# Patient Record
Sex: FEMALE | Race: WHITE | NOT HISPANIC OR LATINO | URBAN - METROPOLITAN AREA
[De-identification: names, ages, dates, MRNs, and addresses within clinical notes are randomized per-mention and may not be internally consistent; named-entity substitution may affect disease eponyms.]

---

## 2018-07-05 ENCOUNTER — EMERGENCY (EMERGENCY)
Facility: HOSPITAL | Age: 68
LOS: 1 days | Discharge: HOME | End: 2018-07-05
Admitting: EMERGENCY MEDICINE
Payer: MEDICARE

## 2018-07-05 VITALS
OXYGEN SATURATION: 100 % | TEMPERATURE: 98 F | WEIGHT: 119.93 LBS | HEART RATE: 64 BPM | DIASTOLIC BLOOD PRESSURE: 88 MMHG | RESPIRATION RATE: 17 BRPM | SYSTOLIC BLOOD PRESSURE: 144 MMHG
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
## 2018-07-05 DIAGNOSIS — S61.210A LACERATION WITHOUT FOREIGN BODY OF RIGHT INDEX FINGER WITHOUT DAMAGE TO NAIL, INITIAL ENCOUNTER: ICD-10-CM
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
## 2018-07-05 DIAGNOSIS — Z23 ENCOUNTER FOR IMMUNIZATION: ICD-10-CM

## 2018-07-05 DIAGNOSIS — S61.201A UNSPECIFIED OPEN WOUND OF LEFT INDEX FINGER WITHOUT DAMAGE TO NAIL, INITIAL ENCOUNTER: ICD-10-CM

## 2018-07-05 PROCEDURE — 99283 EMERGENCY DEPT VISIT LOW MDM: CPT

## 2018-07-05 RX ORDER — TETANUS TOXOID, REDUCED DIPHTHERIA TOXOID AND ACELLULAR PERTUSSIS VACCINE, ADSORBED 5; 2.5; 8; 8; 2.5 [IU]/.5ML; [IU]/.5ML; UG/.5ML; UG/.5ML; UG/.5ML
0.5 SUSPENSION INTRAMUSCULAR ONCE
Qty: 0 | Refills: 0 | Status: COMPLETED | OUTPATIENT
Start: 2018-07-05 | End: 2018-07-05

## 2018-07-05 RX ADMIN — TETANUS TOXOID, REDUCED DIPHTHERIA TOXOID AND ACELLULAR PERTUSSIS VACCINE, ADSORBED 0.5 MILLILITER(S): 5; 2.5; 8; 8; 2.5 SUSPENSION INTRAMUSCULAR at 10:24

## 2018-07-05 NOTE — ED PROVIDER NOTE - SKIN, MLM
2cm superficial laceration to the ulnar side of 2nd digit near pip joint, nerve vascular intact, cap refill <2 sec, no active bleeding.

## 2018-07-05 NOTE — ED ADULT TRIAGE NOTE - CHIEF COMPLAINT QUOTE
pt. aaox3 c/o laceration to pointer finger with glass. pt. has controlled bleeding with band aid. pt. unknown last tetanus.

## 2018-07-05 NOTE — ED PROVIDER NOTE - OBJECTIVE STATEMENT
69 y/o F w/ no significant pmhx presents to ED w/ c/o laceration to R index finger that occurred last night while cleaning a glass. Pt is not sure when last tetanus. Reports that the glass she was cleaning cut in one large piece, no concern for small shards in wound. No numbness, no weakness, no tingling. Pt has full ROM, no active bleeding. Pt noticed blood in bandage this morning and wanted it checked out.

## 2021-04-25 ENCOUNTER — INPATIENT (INPATIENT)
Facility: HOSPITAL | Age: 71
LOS: 3 days | Discharge: HOME HEALTH | DRG: 481 | End: 2021-04-29
Attending: STUDENT IN AN ORGANIZED HEALTH CARE EDUCATION/TRAINING PROGRAM | Admitting: STUDENT IN AN ORGANIZED HEALTH CARE EDUCATION/TRAINING PROGRAM
Payer: MEDICARE

## 2021-04-25 ENCOUNTER — TRANSCRIPTION ENCOUNTER (OUTPATIENT)
Age: 71
End: 2021-04-25

## 2021-04-25 VITALS
SYSTOLIC BLOOD PRESSURE: 151 MMHG | RESPIRATION RATE: 16 BRPM | DIASTOLIC BLOOD PRESSURE: 86 MMHG | TEMPERATURE: 98 F | WEIGHT: 115.08 LBS | HEIGHT: 66 IN | HEART RATE: 85 BPM | OXYGEN SATURATION: 99 %

## 2021-04-25 DIAGNOSIS — Z93.2 ILEOSTOMY STATUS: Chronic | ICD-10-CM

## 2021-04-25 LAB
ALBUMIN SERPL ELPH-MCNC: 3.3 G/DL — LOW (ref 3.4–5)
ALP SERPL-CCNC: 68 U/L — SIGNIFICANT CHANGE UP (ref 40–120)
ALT FLD-CCNC: 22 U/L — SIGNIFICANT CHANGE UP (ref 12–42)
ANION GAP SERPL CALC-SCNC: 8 MMOL/L — LOW (ref 9–16)
APPEARANCE UR: CLEAR — SIGNIFICANT CHANGE UP
APTT BLD: 28.1 SEC — SIGNIFICANT CHANGE UP (ref 27.5–35.5)
AST SERPL-CCNC: 17 U/L — SIGNIFICANT CHANGE UP (ref 15–37)
BACTERIA # UR AUTO: PRESENT /HPF
BILIRUB SERPL-MCNC: 0.2 MG/DL — SIGNIFICANT CHANGE UP (ref 0.2–1.2)
BILIRUB UR-MCNC: NEGATIVE — SIGNIFICANT CHANGE UP
BUN SERPL-MCNC: 21 MG/DL — SIGNIFICANT CHANGE UP (ref 7–23)
CALCIUM SERPL-MCNC: 9.1 MG/DL — SIGNIFICANT CHANGE UP (ref 8.5–10.5)
CHLORIDE SERPL-SCNC: 106 MMOL/L — SIGNIFICANT CHANGE UP (ref 96–108)
CK SERPL-CCNC: 85 U/L — SIGNIFICANT CHANGE UP (ref 26–192)
CO2 SERPL-SCNC: 25 MMOL/L — SIGNIFICANT CHANGE UP (ref 22–31)
COLOR SPEC: YELLOW — SIGNIFICANT CHANGE UP
CREAT SERPL-MCNC: 0.91 MG/DL — SIGNIFICANT CHANGE UP (ref 0.5–1.3)
DIFF PNL FLD: NEGATIVE — SIGNIFICANT CHANGE UP
GLUCOSE SERPL-MCNC: 117 MG/DL — HIGH (ref 70–99)
GLUCOSE UR QL: NEGATIVE — SIGNIFICANT CHANGE UP
HCT VFR BLD CALC: 39.9 % — SIGNIFICANT CHANGE UP (ref 34.5–45)
HGB BLD-MCNC: 13.4 G/DL — SIGNIFICANT CHANGE UP (ref 11.5–15.5)
INR BLD: 1 — SIGNIFICANT CHANGE UP (ref 0.88–1.16)
KETONES UR-MCNC: NEGATIVE — SIGNIFICANT CHANGE UP
LEUKOCYTE ESTERASE UR-ACNC: ABNORMAL
MCHC RBC-ENTMCNC: 30.5 PG — SIGNIFICANT CHANGE UP (ref 27–34)
MCHC RBC-ENTMCNC: 33.6 GM/DL — SIGNIFICANT CHANGE UP (ref 32–36)
MCV RBC AUTO: 90.9 FL — SIGNIFICANT CHANGE UP (ref 80–100)
NITRITE UR-MCNC: POSITIVE
NRBC # BLD: 0 /100 WBCS — SIGNIFICANT CHANGE UP (ref 0–0)
PH UR: 5.5 — SIGNIFICANT CHANGE UP (ref 5–8)
PLATELET # BLD AUTO: 295 K/UL — SIGNIFICANT CHANGE UP (ref 150–400)
POTASSIUM SERPL-MCNC: 3.1 MMOL/L — LOW (ref 3.5–5.3)
POTASSIUM SERPL-SCNC: 3.1 MMOL/L — LOW (ref 3.5–5.3)
PROT SERPL-MCNC: 7 G/DL — SIGNIFICANT CHANGE UP (ref 6.4–8.2)
PROT UR-MCNC: NEGATIVE MG/DL — SIGNIFICANT CHANGE UP
PROTHROM AB SERPL-ACNC: 11.8 SEC — SIGNIFICANT CHANGE UP (ref 10.6–13.6)
RBC # BLD: 4.39 M/UL — SIGNIFICANT CHANGE UP (ref 3.8–5.2)
RBC # FLD: 14.1 % — SIGNIFICANT CHANGE UP (ref 10.3–14.5)
RBC CASTS # UR COMP ASSIST: < 5 /HPF — SIGNIFICANT CHANGE UP
SARS-COV-2 RNA SPEC QL NAA+PROBE: SIGNIFICANT CHANGE UP
SODIUM SERPL-SCNC: 139 MMOL/L — SIGNIFICANT CHANGE UP (ref 132–145)
SP GR SPEC: 1.02 — SIGNIFICANT CHANGE UP (ref 1–1.03)
TROPONIN I SERPL-MCNC: <0.017 NG/ML — LOW (ref 0.02–0.06)
TROPONIN T SERPL-MCNC: 0.01 NG/ML — SIGNIFICANT CHANGE UP (ref 0–0.01)
UROBILINOGEN FLD QL: 0.2 E.U./DL — SIGNIFICANT CHANGE UP
WBC # BLD: 7.9 K/UL — SIGNIFICANT CHANGE UP (ref 3.8–10.5)
WBC # FLD AUTO: 7.9 K/UL — SIGNIFICANT CHANGE UP (ref 3.8–10.5)
WBC UR QL: < 5 /HPF — SIGNIFICANT CHANGE UP

## 2021-04-25 PROCEDURE — 71045 X-RAY EXAM CHEST 1 VIEW: CPT | Mod: 26

## 2021-04-25 PROCEDURE — 99221 1ST HOSP IP/OBS SF/LOW 40: CPT

## 2021-04-25 PROCEDURE — 73700 CT LOWER EXTREMITY W/O DYE: CPT | Mod: 26,RT,MH

## 2021-04-25 PROCEDURE — 99285 EMERGENCY DEPT VISIT HI MDM: CPT | Mod: CS

## 2021-04-25 PROCEDURE — 73501 X-RAY EXAM HIP UNI 1 VIEW: CPT | Mod: 26,RT

## 2021-04-25 PROCEDURE — 73562 X-RAY EXAM OF KNEE 3: CPT | Mod: 26,RT

## 2021-04-25 RX ORDER — SODIUM CHLORIDE 9 MG/ML
1000 INJECTION, SOLUTION INTRAVENOUS
Refills: 0 | Status: DISCONTINUED | OUTPATIENT
Start: 2021-04-25 | End: 2021-04-26

## 2021-04-25 RX ORDER — ACETAMINOPHEN 500 MG
975 TABLET ORAL EVERY 8 HOURS
Refills: 0 | Status: DISCONTINUED | OUTPATIENT
Start: 2021-04-25 | End: 2021-04-26

## 2021-04-25 RX ORDER — MORPHINE SULFATE 50 MG/1
2 CAPSULE, EXTENDED RELEASE ORAL ONCE
Refills: 0 | Status: DISCONTINUED | OUTPATIENT
Start: 2021-04-25 | End: 2021-04-25

## 2021-04-25 RX ORDER — OXYCODONE AND ACETAMINOPHEN 5; 325 MG/1; MG/1
1 TABLET ORAL EVERY 4 HOURS
Refills: 0 | Status: DISCONTINUED | OUTPATIENT
Start: 2021-04-25 | End: 2021-04-25

## 2021-04-25 RX ORDER — CHLORHEXIDINE GLUCONATE 213 G/1000ML
1 SOLUTION TOPICAL EVERY 12 HOURS
Refills: 0 | Status: COMPLETED | OUTPATIENT
Start: 2021-04-25 | End: 2021-04-26

## 2021-04-25 RX ORDER — POTASSIUM CHLORIDE 20 MEQ
10 PACKET (EA) ORAL ONCE
Refills: 0 | Status: COMPLETED | OUTPATIENT
Start: 2021-04-25 | End: 2021-04-25

## 2021-04-25 RX ORDER — SENNA PLUS 8.6 MG/1
2 TABLET ORAL AT BEDTIME
Refills: 0 | Status: DISCONTINUED | OUTPATIENT
Start: 2021-04-25 | End: 2021-04-26

## 2021-04-25 RX ORDER — OXYCODONE HYDROCHLORIDE 5 MG/1
5 TABLET ORAL EVERY 4 HOURS
Refills: 0 | Status: DISCONTINUED | OUTPATIENT
Start: 2021-04-25 | End: 2021-04-26

## 2021-04-25 RX ORDER — MORPHINE SULFATE 50 MG/1
4 CAPSULE, EXTENDED RELEASE ORAL ONCE
Refills: 0 | Status: DISCONTINUED | OUTPATIENT
Start: 2021-04-25 | End: 2021-04-25

## 2021-04-25 RX ORDER — CYCLOBENZAPRINE HYDROCHLORIDE 10 MG/1
5 TABLET, FILM COATED ORAL THREE TIMES A DAY
Refills: 0 | Status: DISCONTINUED | OUTPATIENT
Start: 2021-04-25 | End: 2021-04-29

## 2021-04-25 RX ORDER — SODIUM CHLORIDE 9 MG/ML
1000 INJECTION INTRAMUSCULAR; INTRAVENOUS; SUBCUTANEOUS ONCE
Refills: 0 | Status: COMPLETED | OUTPATIENT
Start: 2021-04-25 | End: 2021-04-25

## 2021-04-25 RX ORDER — POLYETHYLENE GLYCOL 3350 17 G/17G
17 POWDER, FOR SOLUTION ORAL DAILY
Refills: 0 | Status: DISCONTINUED | OUTPATIENT
Start: 2021-04-25 | End: 2021-04-26

## 2021-04-25 RX ORDER — HYDROMORPHONE HYDROCHLORIDE 2 MG/ML
0.5 INJECTION INTRAMUSCULAR; INTRAVENOUS; SUBCUTANEOUS ONCE
Refills: 0 | Status: DISCONTINUED | OUTPATIENT
Start: 2021-04-25 | End: 2021-04-25

## 2021-04-25 RX ORDER — POVIDONE-IODINE 5 %
1 AEROSOL (ML) TOPICAL ONCE
Refills: 0 | Status: COMPLETED | OUTPATIENT
Start: 2021-04-25 | End: 2021-04-26

## 2021-04-25 RX ORDER — OXYCODONE HYDROCHLORIDE 5 MG/1
10 TABLET ORAL EVERY 4 HOURS
Refills: 0 | Status: DISCONTINUED | OUTPATIENT
Start: 2021-04-25 | End: 2021-04-26

## 2021-04-25 RX ORDER — ACETAMINOPHEN 500 MG
650 TABLET ORAL ONCE
Refills: 0 | Status: COMPLETED | OUTPATIENT
Start: 2021-04-25 | End: 2021-04-25

## 2021-04-25 RX ORDER — HYDROMORPHONE HYDROCHLORIDE 2 MG/ML
0.5 INJECTION INTRAMUSCULAR; INTRAVENOUS; SUBCUTANEOUS EVERY 4 HOURS
Refills: 0 | Status: DISCONTINUED | OUTPATIENT
Start: 2021-04-25 | End: 2021-04-26

## 2021-04-25 RX ADMIN — MORPHINE SULFATE 2 MILLIGRAM(S): 50 CAPSULE, EXTENDED RELEASE ORAL at 19:42

## 2021-04-25 RX ADMIN — OXYCODONE HYDROCHLORIDE 10 MILLIGRAM(S): 5 TABLET ORAL at 23:46

## 2021-04-25 RX ADMIN — MORPHINE SULFATE 2 MILLIGRAM(S): 50 CAPSULE, EXTENDED RELEASE ORAL at 20:25

## 2021-04-25 RX ADMIN — MORPHINE SULFATE 4 MILLIGRAM(S): 50 CAPSULE, EXTENDED RELEASE ORAL at 17:14

## 2021-04-25 RX ADMIN — SODIUM CHLORIDE 100 MILLILITER(S): 9 INJECTION, SOLUTION INTRAVENOUS at 23:40

## 2021-04-25 RX ADMIN — HYDROMORPHONE HYDROCHLORIDE 0.5 MILLIGRAM(S): 2 INJECTION INTRAMUSCULAR; INTRAVENOUS; SUBCUTANEOUS at 21:07

## 2021-04-25 RX ADMIN — Medication 100 MILLIEQUIVALENT(S): at 18:44

## 2021-04-25 RX ADMIN — SODIUM CHLORIDE 250 MILLILITER(S): 9 INJECTION INTRAMUSCULAR; INTRAVENOUS; SUBCUTANEOUS at 18:45

## 2021-04-25 RX ADMIN — Medication 650 MILLIGRAM(S): at 15:57

## 2021-04-25 NOTE — ED PROVIDER NOTE - OBJECTIVE STATEMENT
70y Female presents to the ED complaining of fall. Patient was walking and got his foot stuck in a divot. Patient reports tenderness in right knee and minor abrasions to the bilateral knees.

## 2021-04-25 NOTE — H&P ADULT - HISTORY OF PRESENT ILLNESS
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 States she was crossing the street when she tripped over a pothole and fell forward onto her hands and knees, with R knee taking most of weight of fall. Denies head trauma or LOC, pain or injury elsewhere.  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 States she was crossing the street when she tripped over a pothole and fell forward onto her hands and knees, with R knee taking most of weight of fall. Denies head trauma or LOC, pain or injury elsewhere. Denies numbness or parasthesias in the right leg. First time injury to the knee.

## 2021-04-25 NOTE — H&P ADULT - NSICDXPASTMEDICALHX_GEN_ALL_CORE_FT
PAST MEDICAL HISTORY:  History of ulcerative colitis s/p Cordova pouch procedure    Substance abuse now in recovery

## 2021-04-25 NOTE — ED ADULT TRIAGE NOTE - CHIEF COMPLAINT QUOTE
BIBA s/p mechanical fall c/o right knee with bilateral knee abrasions. no head injury. denies ACs use. +tetanus UTD

## 2021-04-25 NOTE — ED PROVIDER NOTE - CARE PLAN
Principal Discharge DX:	Closed fracture of distal end of right femur, unspecified fracture morphology, initial encounter

## 2021-04-25 NOTE — ED ADULT NURSE NOTE - OBJECTIVE STATEMENT
Pt is a 70y female complaining of fall. Pt states that she was walking on the street when her left foot fell into a pothole. Pt states that her right leg then twisted. Pt complaining of right knee pain at this time. Pt states that she is unable to straighten her leg and unable to put pressure on it. Pt denies hitting head. Pt with abrasion to bilateral knees. Ice pack applied to right knee.

## 2021-04-25 NOTE — H&P ADULT - NSHPLABSRESULTS_GEN_ALL_CORE
3 Radiographs of the right knee and CT scan were obtained at [facility] ER on 4/25/21 and reviewed with the patient. Findings include a distal 1/3 femur fracture with intercondylar extension. Joint effusion present. 3 Radiographs of the right knee and CT scan were obtained at [facility] ER on 4/25/21 and reviewed with the patient. Findings include a distal 1/3 femur fracture with intercondylar extension. Joint effusion present.  AP/lateral of the right hip, femur and tibia/fibula were obtained and reviewed. Fracture isolated to the distal femur. No femoral neck fracture.

## 2021-04-25 NOTE — ED PROVIDER NOTE - NS ED MD DISPO SPECIAL CONSIDERATION1
Patient calling she never got MRI Dr Jeffery Son ordered previously and now she is seeing different specialist with [facility] and they need an MRI before they will see her for dizziness/ they think it is a neck issue? So can we please request order for MRI again?  P Low Suspicion of COVID-19

## 2021-04-25 NOTE — ED PROVIDER NOTE - CLINICAL SUMMARY MEDICAL DECISION MAKING FREE TEXT BOX
70y Female presents to the ED s/p mechanical trip & fall. Will order XR and will consult Dr. Bandar Moura. Will order CT if indicated.

## 2021-04-25 NOTE — CONSULT NOTE ADULT - ASSESSMENT
70F PHM Ulcerative colitis diagnosed 40 years ago s/p ileostomy and now a Kock pouch continent ileostomy presents to the ED s/p mechanical fall earlier this afternoon found to have R. distal femur fracture pending surgical intervention Medicine consulted for pre-op evaluation.

## 2021-04-25 NOTE — CONSULT NOTE ADULT - PROBLEM SELECTOR RECOMMENDATION 9
Patient presents after mechanical fall earlier today on her right leg, she states that he did not experience any prodromal sx or LOC concerning for syncope. She states to be able to walk for miles and climb multiple flights of stairs without JOHNSON or CP. EKG NSR with PVCs, qtc 422. CT R. Knee with distal fracture of the right femur. She now will undergo surgical intervention by OR in the AM.   - RCRI Score is 0 points representing a Class I Risk, which is a 3.9% 30-day risk of death, MI, or cardiac arrest.   - Jack score is 0.0 % risk of myocardial infarction or cardiac arrest, intraoperatively or up to 30 days post-op  - patient is low risk for intermediate-risk procedure.

## 2021-04-25 NOTE — CONSULT NOTE ADULT - PROBLEM SELECTOR RECOMMENDATION 2
presenting after mechanical fall, found to have right distal femoral fracture  - pain control as per primary team  - plan as above

## 2021-04-25 NOTE — CONSULT NOTE ADULT - PROBLEM SELECTOR RECOMMENDATION 3
history of ulcerative colitis diagnosed 40 years ago s/p ileostomy and multiple correctional surgeries, now with Cordova Pouch  - currently on no medications   - does not appear in exacerbation  - continue to monitor

## 2021-04-25 NOTE — H&P ADULT - ATTENDING COMMENTS
Bandar Moura MD  Orthopaedic Surgery    [address] [place] [address] [address]  [place] [address]  Office: [phone]

## 2021-04-25 NOTE — CONSULT NOTE ADULT - SUBJECTIVE AND OBJECTIVE BOX
70F PHM Ulcerative colitis diagnosed 40 years ago s/p ileostomy and now a Kock pouch continent ileostomy presents to the ED s/p mechanical fall earlier this afternoon. Patient says she was walking and got her foot stuck in a pothole. She denies any head trauma, LOC, or any prodromal symptoms such as lightheadedness of dizziness. She says she is able to "walk for miles" without chest pain or getting short of breath. Patient reports tenderness in right knee and minor abrasions to the bilateral knees. She reports 2 mechanical falls years ago without surgical interventions.     PMH: Ulcerative Colitis dx 40 years ago  PSH: Ileostomy 40 years ago with a Kock Pouch placed 18 months later  Meds: None  Family history: noncontributory  Allergies: None  Social history: 24 year sober from alcohol, cocaine, marijuana. [place] . Ambulates independently.    T(C): 37.1 (04-25-21 @ 21:49), Max: 37.5 (04-25-21 @ 20:26)  HR: 83 (04-25-21 @ 21:49) (73 - 89)  BP: 131/79 (04-25-21 @ 21:49) (131/79 - 167/101)  RR: 18 (04-25-21 @ 21:49) (16 - 18)  SpO2: 97% (04-25-21 @ 21:49) (96% - 99%)  Wt(kg): --Vital Signs Last 24 Hrs  T(C): 37.1 (25 Apr 2021 21:49), Max: 37.5 (25 Apr 2021 20:26)  T(F): 98.7 (25 Apr 2021 21:49), Max: 99.5 (25 Apr 2021 20:26)  HR: 83 (25 Apr 2021 21:49) (73 - 89)  BP: 131/79 (25 Apr 2021 21:49) (131/79 - 167/101)  BP(mean): --  RR: 18 (25 Apr 2021 21:49) (16 - 18)  SpO2: 97% (25 Apr 2021 21:49) (96% - 99%)    PHYSICAL EXAM:  GENERAL: NAD, well-groomed, well-developed  HEAD:  Atraumatic, Normocephalic  EYES: EOMI, PERRLA, conjunctiva and sclera clear  ENMT: No tonsillar erythema, exudates, or enlargement; Moist mucous membranes, Good dentition, No lesions  NECK: Supple, No JVD, Normal thyroid  NERVOUS SYSTEM:  Alert & Oriented X3, Good concentration; Motor Strength in RLE limited due to pain, sensation intact, 5/5  LLE and b/l upper extremities; DTRs 2+ intact and symmetric  CHEST/LUNG: Clear to percussion bilaterally; No rales, rhonchi, wheezing, or rubs  HEART: Regular rate and rhythm; No murmurs, rubs, or gallops  ABDOMEN: Multiple well-healed incisions due to past abdominal surgeries, clean stoma in the umbilicus region. Soft, Nontender, Nondistended; Bowel sounds present.  EXTREMITIES:  right knee wrapped in bandages and ice with limited range of motion due to pain and tenderness on palpation around right lateral femur and right knee. Multiple knee abrasions bilaterally. 2+ Peripheral Pulses, No clubbing, cyanosis, or edema  LYMPH: No lymphadenopathy noted  SKIN: No rashes or lesions    Consultant(s) Notes Reviewed:  [x ] YES  [ ] NO  Care Discussed with Consultants/Other Providers [ x] YES  [ ] NO    LABS:  CBC   04-25-21 @ 17:22  Hematcorit 39.9  Hemoglobin 13.4  Mean Cell Hemoglobin 30.5  Platelet Count-Automated 295  RBC Count 4.39  Red Cell Distrib Width 14.1  Wbc Count 7.90      BMP  04-25-21 @ 17:22  Anion Gap. Serum 8  Blood Urea Nitrogen,Serm 21  Calcium, Total Serum 9.1  Carbon Dioxide, Serum 25  Chloride, Serum 106  Creatinine, Serum 0.91  eGFR in  74  eGFR in Non Afican American 63  Gloucose, serum 117  Potassium, Serum 3.1  Sodium, Serum 139                  CMP  04-25-21 @ 17:22  Angela Aminotransferase(ALT/SGPT)22  Albumin, Serum 3.3  Alkaline Phosphatase, Serum 68  Anion Gap, Serum 8  Aspartate Aminotransferase (AST/SGOT)17  Bilirubin Total, Serum 0.2  Blood Urea Nitrogen, Serum 21  Calcium,Total Serum 9.1  Carbon Dioxide, Serum 25  Chloride, Serum 106  Creatinine, Serum 0.91  eGFR if  74  eGFR if Non African American 63  Glucose, Serum 117  Potassium, Serum 3.1  Protein Total, Serum 7.0  Sodium, Serum 139                          PT/INR  PT/INR  04-25-21 @ 17:22  INR 1.00  Prothrombin Time Comment --  Prothrobin Time, [id number]      Amylase/Lipase            RADIOLOGY & ADDITIONAL TESTS:    Imaging Personally Reviewed:  [ ] YES  [ ] NO

## 2021-04-25 NOTE — H&P ADULT - NSHPPHYSICALEXAM_GEN_ALL_CORE
Physical Exam:  General: Resting comfortably in bed. AAOx3. NAD.  Extremities:        LUE: Superficial abrasions to palm. SILT C5-T1 distribution, symmetric. No TTP clavicle, shoulder, arm, elbow, forearm, wrist, hand. Painless ROM of shoulder, elbow, wrist. AIN/PIN/U motor intact. WWP, radial pulse 2+       RUE: No gross deformity. SILT C5-T1 distribution, symmetric. No TTP clavicle, shoulder, arm, elbow, forearm, wrist, hand. Painless ROM of shoulder, elbow, wrist. AIN/PIN/U motor intact. WWP, radial pulse 2+       LLE: Superficial abrasion to anterior knee. SILT L2-S1 distribution, symmetric. No TTP GT, thigh, knee, leg, ankle, foot. Painless ROM hip, knee, ankle, toes. TA/EHL/FHL/GS motor intact. WWP, DP 2+       RLE: Superficial abrasion to anterior knee w/o open wounds, knee swollen. Long posterior slab in place. Mild TTP R hip. SILT L2-S1 distribution, symmetric. No TTP thigh, leg, ankle, foot. Painless ROM ankle, toes. TA/EHL/FHL/GS motor intact 5/5, symmetrical. WWP, DP 2+. Physical Exam:  General: Resting comfortably in bed. AAOx3. NAD.  Extremities:        LUE: Superficial abrasions to palm. SILT C5-T1 distribution, symmetric. No TTP clavicle, shoulder, arm, elbow, forearm, wrist, hand. Painless ROM of shoulder, elbow, wrist. AIN/PIN/U motor intact. WWP, radial pulse 2+       RUE: No gross deformity. SILT C5-T1 distribution, symmetric. No TTP clavicle, shoulder, arm, elbow, forearm, wrist, hand. Painless ROM of shoulder, elbow, wrist. AIN/PIN/U motor intact. WWP, radial pulse 2+       LLE: Superficial abrasion to anterior knee. SILT L2-S1 distribution, symmetric. No TTP GT, thigh, knee, leg, ankle, foot. Painless ROM hip, knee, ankle, toes. TA/EHL/FHL/GS motor intact. WWP, DP 2+       RLE: Superficial abrasion to anterior knee w/o open wounds, knee swollen. Long posterior slab in place. Tenderness to palpation over the distal femur and knee. Pain with any ROM of knee. SILT L2-S1 distribution, symmetric. No TTP thigh, leg, ankle, foot. Painless ROM ankle, toes. TA/EHL/FHL/GS motor intact 5/5, symmetrical. WWP, DP 2+.

## 2021-04-25 NOTE — H&P ADULT - ASSESSMENT
74F with R distal femur fx s/p fall  - Pain control  - NPO/IVF  - Hold anticoagulation for OR  - Labs  - PT/INR, T+S  - CXR  - EKG  - UA  - Medical clearance  - MRSA nasal swab  - Discussed with attending     74F with R distal femur fx s/p fall, for OR 4/26  - Pain control  - NPO/IVF  - Hold anticoagulation for OR  - Labs  - PT/INR, T+S  - CXR  - EKG  - UA  - Medical clearance  - MRSA nasal swab  - Discussed with attending     74F with R distal femur fx s/p fall, for OR 4/26  - Pain control  - NPO/IVF  - Hold anticoagulation for OR  - Labs  - PT/INR, T+S  - CXR  - EKG  - UA  - Medical clearance  - MRSA nasal swab  - Discussed with attending      The patient was counseled in detail regarding the diagnosis, treatment options available, prognosis of each treatment option and the potential risks and complications. Nonoperative treatment would consist of immobilization and non-weightbearing. Patient was counseled that surgical intervention would allow for earlier range of motion, earlier return to weightbearing and activity.   The risks or surgery include, but are not limited to, anesthetic death, neurovascular complications, pulmonary embolism, deep vein thrombosis, wound dehiscence, failure of any or all of the discussed procedures, infection of the joint or surrounding soft tissue, need for revision surgery, chronic pain, limitations in activities of daily living, inability to return to work, and loss of normal range of motion or functional use of the extremity. The patient understands that additional surgery may be indicated and that a surgical assistant may be required for the procedure.   The patient is aware of and understands these risks, and wishes to proceed with the proposed surgical procedure. Informed consent was signed.  Preoperative instructions were reviewed with the patient. Preoperative laboratory data will be obtained per the medical team, anesthesia and orthopedic teams.

## 2021-04-26 DIAGNOSIS — Z01.818 ENCOUNTER FOR OTHER PREPROCEDURAL EXAMINATION: ICD-10-CM

## 2021-04-26 DIAGNOSIS — Z87.19 PERSONAL HISTORY OF OTHER DISEASES OF THE DIGESTIVE SYSTEM: ICD-10-CM

## 2021-04-26 DIAGNOSIS — S72.401A UNSPECIFIED FRACTURE OF LOWER END OF RIGHT FEMUR, INITIAL ENCOUNTER FOR CLOSED FRACTURE: ICD-10-CM

## 2021-04-26 LAB
ANION GAP SERPL CALC-SCNC: 9 MMOL/L — SIGNIFICANT CHANGE UP (ref 5–17)
BASOPHILS # BLD AUTO: 0.01 K/UL — SIGNIFICANT CHANGE UP (ref 0–0.2)
BASOPHILS NFR BLD AUTO: 0.2 % — SIGNIFICANT CHANGE UP (ref 0–2)
BLD GP AB SCN SERPL QL: POSITIVE — SIGNIFICANT CHANGE UP
BUN SERPL-MCNC: 11 MG/DL — SIGNIFICANT CHANGE UP (ref 7–23)
CALCIUM SERPL-MCNC: 8.7 MG/DL — SIGNIFICANT CHANGE UP (ref 8.4–10.5)
CHLORIDE SERPL-SCNC: 106 MMOL/L — SIGNIFICANT CHANGE UP (ref 96–108)
CO2 SERPL-SCNC: 24 MMOL/L — SIGNIFICANT CHANGE UP (ref 22–31)
COVID-19 SPIKE DOMAIN AB INTERP: POSITIVE
COVID-19 SPIKE DOMAIN ANTIBODY RESULT: >250 U/ML — HIGH
CREAT SERPL-MCNC: 0.72 MG/DL — SIGNIFICANT CHANGE UP (ref 0.5–1.3)
EOSINOPHIL # BLD AUTO: 0.04 K/UL — SIGNIFICANT CHANGE UP (ref 0–0.5)
EOSINOPHIL NFR BLD AUTO: 0.7 % — SIGNIFICANT CHANGE UP (ref 0–6)
GLUCOSE SERPL-MCNC: 103 MG/DL — HIGH (ref 70–99)
HCT VFR BLD CALC: 37.9 % — SIGNIFICANT CHANGE UP (ref 34.5–45)
HCV AB S/CO SERPL IA: 0.1 S/CO — SIGNIFICANT CHANGE UP
HCV AB SERPL-IMP: SIGNIFICANT CHANGE UP
HGB BLD-MCNC: 12 G/DL — SIGNIFICANT CHANGE UP (ref 11.5–15.5)
IMM GRANULOCYTES NFR BLD AUTO: 0.5 % — SIGNIFICANT CHANGE UP (ref 0–1.5)
LYMPHOCYTES # BLD AUTO: 1.17 K/UL — SIGNIFICANT CHANGE UP (ref 1–3.3)
LYMPHOCYTES # BLD AUTO: 19.5 % — SIGNIFICANT CHANGE UP (ref 13–44)
MCHC RBC-ENTMCNC: 29.9 PG — SIGNIFICANT CHANGE UP (ref 27–34)
MCHC RBC-ENTMCNC: 31.7 GM/DL — LOW (ref 32–36)
MCV RBC AUTO: 94.5 FL — SIGNIFICANT CHANGE UP (ref 80–100)
MONOCYTES # BLD AUTO: 0.96 K/UL — HIGH (ref 0–0.9)
MONOCYTES NFR BLD AUTO: 16 % — HIGH (ref 2–14)
NEUTROPHILS # BLD AUTO: 3.8 K/UL — SIGNIFICANT CHANGE UP (ref 1.8–7.4)
NEUTROPHILS NFR BLD AUTO: 63.1 % — SIGNIFICANT CHANGE UP (ref 43–77)
NRBC # BLD: 0 /100 WBCS — SIGNIFICANT CHANGE UP (ref 0–0)
PLATELET # BLD AUTO: 230 K/UL — SIGNIFICANT CHANGE UP (ref 150–400)
POTASSIUM SERPL-MCNC: 3.8 MMOL/L — SIGNIFICANT CHANGE UP (ref 3.5–5.3)
POTASSIUM SERPL-SCNC: 3.8 MMOL/L — SIGNIFICANT CHANGE UP (ref 3.5–5.3)
RBC # BLD: 4.01 M/UL — SIGNIFICANT CHANGE UP (ref 3.8–5.2)
RBC # FLD: 14.3 % — SIGNIFICANT CHANGE UP (ref 10.3–14.5)
RH IG SCN BLD-IMP: NEGATIVE — SIGNIFICANT CHANGE UP
SARS-COV-2 IGG+IGM SERPL QL IA: >250 U/ML — HIGH
SARS-COV-2 IGG+IGM SERPL QL IA: POSITIVE
SODIUM SERPL-SCNC: 139 MMOL/L — SIGNIFICANT CHANGE UP (ref 135–145)
WBC # BLD: 6.01 K/UL — SIGNIFICANT CHANGE UP (ref 3.8–10.5)
WBC # FLD AUTO: 6.01 K/UL — SIGNIFICANT CHANGE UP (ref 3.8–10.5)

## 2021-04-26 PROCEDURE — 86077 PHYS BLOOD BANK SERV XMATCH: CPT

## 2021-04-26 PROCEDURE — 73552 X-RAY EXAM OF FEMUR 2/>: CPT | Mod: 26,RT

## 2021-04-26 PROCEDURE — 99232 SBSQ HOSP IP/OBS MODERATE 35: CPT

## 2021-04-26 PROCEDURE — 73590 X-RAY EXAM OF LOWER LEG: CPT | Mod: 26,RT

## 2021-04-26 PROCEDURE — 73502 X-RAY EXAM HIP UNI 2-3 VIEWS: CPT | Mod: 26,RT

## 2021-04-26 RX ORDER — OXYCODONE HYDROCHLORIDE 5 MG/1
5 TABLET ORAL
Refills: 0 | Status: DISCONTINUED | OUTPATIENT
Start: 2021-04-26 | End: 2021-04-29

## 2021-04-26 RX ORDER — CEFAZOLIN SODIUM 1 G
2000 VIAL (EA) INJECTION EVERY 8 HOURS
Refills: 0 | Status: DISCONTINUED | OUTPATIENT
Start: 2021-04-26 | End: 2021-04-26

## 2021-04-26 RX ORDER — SENNA PLUS 8.6 MG/1
2 TABLET ORAL AT BEDTIME
Refills: 0 | Status: DISCONTINUED | OUTPATIENT
Start: 2021-04-26 | End: 2021-04-26

## 2021-04-26 RX ORDER — SENNA PLUS 8.6 MG/1
2 TABLET ORAL AT BEDTIME
Refills: 0 | Status: DISCONTINUED | OUTPATIENT
Start: 2021-04-26 | End: 2021-04-29

## 2021-04-26 RX ORDER — SODIUM CHLORIDE 9 MG/ML
1000 INJECTION, SOLUTION INTRAVENOUS
Refills: 0 | Status: DISCONTINUED | OUTPATIENT
Start: 2021-04-26 | End: 2021-04-27

## 2021-04-26 RX ORDER — ALBUMIN HUMAN 25 %
500 VIAL (ML) INTRAVENOUS ONCE
Refills: 0 | Status: DISCONTINUED | OUTPATIENT
Start: 2021-04-26 | End: 2021-04-29

## 2021-04-26 RX ORDER — HYDROMORPHONE HYDROCHLORIDE 2 MG/ML
0.5 INJECTION INTRAMUSCULAR; INTRAVENOUS; SUBCUTANEOUS
Refills: 0 | Status: DISCONTINUED | OUTPATIENT
Start: 2021-04-26 | End: 2021-04-27

## 2021-04-26 RX ORDER — HYDROMORPHONE HYDROCHLORIDE 2 MG/ML
0.5 INJECTION INTRAMUSCULAR; INTRAVENOUS; SUBCUTANEOUS
Refills: 0 | Status: DISCONTINUED | OUTPATIENT
Start: 2021-04-26 | End: 2021-04-29

## 2021-04-26 RX ORDER — ASPIRIN/CALCIUM CARB/MAGNESIUM 324 MG
325 TABLET ORAL
Refills: 0 | Status: DISCONTINUED | OUTPATIENT
Start: 2021-04-27 | End: 2021-04-29

## 2021-04-26 RX ORDER — DIPHENHYDRAMINE HCL 50 MG
25 CAPSULE ORAL EVERY 4 HOURS
Refills: 0 | Status: DISCONTINUED | OUTPATIENT
Start: 2021-04-26 | End: 2021-04-29

## 2021-04-26 RX ORDER — ACETAMINOPHEN 500 MG
975 TABLET ORAL EVERY 8 HOURS
Refills: 0 | Status: DISCONTINUED | OUTPATIENT
Start: 2021-04-26 | End: 2021-04-29

## 2021-04-26 RX ORDER — PANTOPRAZOLE SODIUM 20 MG/1
40 TABLET, DELAYED RELEASE ORAL
Refills: 0 | Status: DISCONTINUED | OUTPATIENT
Start: 2021-04-26 | End: 2021-04-29

## 2021-04-26 RX ORDER — MAGNESIUM HYDROXIDE 400 MG/1
30 TABLET, CHEWABLE ORAL DAILY
Refills: 0 | Status: DISCONTINUED | OUTPATIENT
Start: 2021-04-26 | End: 2021-04-29

## 2021-04-26 RX ORDER — OXYCODONE HYDROCHLORIDE 5 MG/1
10 TABLET ORAL
Refills: 0 | Status: DISCONTINUED | OUTPATIENT
Start: 2021-04-26 | End: 2021-04-29

## 2021-04-26 RX ORDER — POLYETHYLENE GLYCOL 3350 17 G/17G
17 POWDER, FOR SOLUTION ORAL AT BEDTIME
Refills: 0 | Status: DISCONTINUED | OUTPATIENT
Start: 2021-04-26 | End: 2021-04-29

## 2021-04-26 RX ORDER — ONDANSETRON 8 MG/1
4 TABLET, FILM COATED ORAL EVERY 6 HOURS
Refills: 0 | Status: DISCONTINUED | OUTPATIENT
Start: 2021-04-26 | End: 2021-04-29

## 2021-04-26 RX ORDER — CEFAZOLIN SODIUM 1 G
2000 VIAL (EA) INJECTION EVERY 8 HOURS
Refills: 0 | Status: COMPLETED | OUTPATIENT
Start: 2021-04-26 | End: 2021-04-27

## 2021-04-26 RX ADMIN — Medication 975 MILLIGRAM(S): at 22:11

## 2021-04-26 RX ADMIN — HYDROMORPHONE HYDROCHLORIDE 0.5 MILLIGRAM(S): 2 INJECTION INTRAMUSCULAR; INTRAVENOUS; SUBCUTANEOUS at 07:00

## 2021-04-26 RX ADMIN — OXYCODONE HYDROCHLORIDE 10 MILLIGRAM(S): 5 TABLET ORAL at 22:11

## 2021-04-26 RX ADMIN — Medication 1 APPLICATION(S): at 12:30

## 2021-04-26 RX ADMIN — Medication 975 MILLIGRAM(S): at 21:11

## 2021-04-26 RX ADMIN — OXYCODONE HYDROCHLORIDE 10 MILLIGRAM(S): 5 TABLET ORAL at 10:03

## 2021-04-26 RX ADMIN — HYDROMORPHONE HYDROCHLORIDE 0.5 MILLIGRAM(S): 2 INJECTION INTRAMUSCULAR; INTRAVENOUS; SUBCUTANEOUS at 11:30

## 2021-04-26 RX ADMIN — Medication 975 MILLIGRAM(S): at 05:10

## 2021-04-26 RX ADMIN — OXYCODONE HYDROCHLORIDE 10 MILLIGRAM(S): 5 TABLET ORAL at 06:10

## 2021-04-26 RX ADMIN — Medication 975 MILLIGRAM(S): at 06:10

## 2021-04-26 RX ADMIN — HYDROMORPHONE HYDROCHLORIDE 0.5 MILLIGRAM(S): 2 INJECTION INTRAMUSCULAR; INTRAVENOUS; SUBCUTANEOUS at 11:16

## 2021-04-26 RX ADMIN — OXYCODONE HYDROCHLORIDE 10 MILLIGRAM(S): 5 TABLET ORAL at 05:10

## 2021-04-26 RX ADMIN — CHLORHEXIDINE GLUCONATE 1 APPLICATION(S): 213 SOLUTION TOPICAL at 06:43

## 2021-04-26 RX ADMIN — HYDROMORPHONE HYDROCHLORIDE 0.5 MILLIGRAM(S): 2 INJECTION INTRAMUSCULAR; INTRAVENOUS; SUBCUTANEOUS at 06:46

## 2021-04-26 RX ADMIN — OXYCODONE HYDROCHLORIDE 10 MILLIGRAM(S): 5 TABLET ORAL at 00:46

## 2021-04-26 RX ADMIN — Medication 2000 MILLIGRAM(S): at 21:14

## 2021-04-26 RX ADMIN — POLYETHYLENE GLYCOL 3350 17 GRAM(S): 17 POWDER, FOR SOLUTION ORAL at 21:11

## 2021-04-26 RX ADMIN — CHLORHEXIDINE GLUCONATE 1 APPLICATION(S): 213 SOLUTION TOPICAL at 01:06

## 2021-04-26 RX ADMIN — SENNA PLUS 2 TABLET(S): 8.6 TABLET ORAL at 21:11

## 2021-04-26 RX ADMIN — OXYCODONE HYDROCHLORIDE 10 MILLIGRAM(S): 5 TABLET ORAL at 21:11

## 2021-04-26 RX ADMIN — OXYCODONE HYDROCHLORIDE 10 MILLIGRAM(S): 5 TABLET ORAL at 11:03

## 2021-04-26 NOTE — PROGRESS NOTE ADULT - SUBJECTIVE AND OBJECTIVE BOX
Ortho Note    Pt comfortable without complaints, pain controlled  Denies CP, SOB, N/V, numbness/tingling     Vital Signs Last 24 Hrs  T(C): 36.6 (04-26-21 @ 08:10), Max: 36.6 (04-26-21 @ 08:10)  T(F): 97.9 (04-26-21 @ 08:10), Max: 97.9 (04-26-21 @ 08:10)  HR: 68 (04-26-21 @ 08:10) (68 - 68)  BP: 115/65 (04-26-21 @ 08:10) (115/65 - 115/65)  BP(mean): --  RR: 17 (04-26-21 @ 08:10) (17 - 17)  SpO2: 97% (04-26-21 @ 08:10) (97% - 97%)  AVSS    General: Pt Alert and oriented, NAD  Right leg in KI   Pulses: 2+ DP pulse RLE   Sensation: intact right foot   Motor: EHL/FHL/TA/GS 5/5         A/P: 70yFemale with right distal femur fx s/p mechanical fall    - optimized for OR today   - NPO since midnight   - c/w Pain Control  - DVT ppx: holding preop   - PT, WBS: NWB RLE preop   - 2 units PRBC on hold for OR, + antibodies on blood, crossmatch pending     Ortho Pager [phone]

## 2021-04-26 NOTE — DIETITIAN INITIAL EVALUATION ADULT. - ORAL INTAKE PTA/DIET HISTORY
B: Eggs, fruits  L & D meals: Salad (arugula, lettuce), Fish (tuna, salmon, halibut, etc), grains (quinoa, brown rice)

## 2021-04-26 NOTE — DIETITIAN INITIAL EVALUATION ADULT. - ENTER TO (ML/KG)
[FreeTextEntry1] : Patient feels well , she is asymptomatic. Her weight has decreased. She is walking regularly and following the diet. Her blood glucose is not done at home. The PPS was 236 mg/dl and the HbA1c was 8.3 %. The renal function is within normal limits, the microalbumin in the urine was normal. She denies low blood glucose during the night. She denies chest pain, or SOB. Denies numbness, tingling or burning sensation on her extremities. Taking her medications regularly. She has seen the Ophthalmologist recently. She has not seen Podiatrist recently. She has not seen the Cardiologist recently. She never went to see Dr. alonso for the parathyroidectomy.\par  35

## 2021-04-26 NOTE — DIETITIAN INITIAL EVALUATION ADULT. - OTHER INFO
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 States she was crossing the street when she tripped over a pothole and fell forward onto her hands and knees, with R knee taking most of weight of fall. R distal femur fx s/p fall, for OR 4/26.    Pt seen in laying in bed, comfortable and very pleasant. She reports no issues with eating PTA, no NKFA, pt follows a pescatarian diet, reports her UBW to be 115 lbs which has been consistent for many years. Wt at adm is 115 lbs. Pt currently NPO, once diet is adv, advised pt to have adequate PO intake to help meet increased needs after procedure scheduled for today (4/26). Pt with R knee pain, managed with medication, denies any nausea or vomiting, no constipation or diarrhea. Last BM 4/25. Marv 18, no edema noted in chart, skin intact pressure wise. Cont. to follow per RD protocol

## 2021-04-26 NOTE — DIETITIAN INITIAL EVALUATION ADULT. - OTHER CALCULATIONS
ABW used to calculate energy needs due to pt's current body weight within % IBW (88%). Needs adj for age, wt, calculated for increased needs/hypermetabolic state d/t post op R knee

## 2021-04-26 NOTE — PROGRESS NOTE ADULT - SUBJECTIVE AND OBJECTIVE BOX
SUBJECTIVE: Patient seen and examined. Pain controlled.  No issues overnight. No HA, f/c/n/v/cp/sob.     OBJECTIVE:  NAD  Vital Signs Last 24 Hrs  T(C): 36.6 (26 Apr 2021 08:10), Max: 37.5 (25 Apr 2021 20:26)  T(F): 97.9 (26 Apr 2021 08:10), Max: 99.5 (25 Apr 2021 20:26)  HR: 68 (26 Apr 2021 08:10) (68 - 89)  BP: 115/65 (26 Apr 2021 08:10) (115/65 - 167/101)  BP(mean): --  RR: 17 (26 Apr 2021 08:10) (16 - 18)  SpO2: 97% (26 Apr 2021 08:10) (96% - 99%)    Physical Exam:   General: Resting comfortably, NAD  RLE: Long posterior splint w/ ace wrap in place.SILT. TA/EHL/FHL/GS motor intact. Warm well perfused; capillary refill <3 seconds               Labs:             12.0   6.01  )-----------( 230      ( 26 Apr 2021 07:03 )             37.9     04-26    139  |  106  |  11  ----------------------------<  103<H>  3.8   |  24  |  0.72    Ca    8.7      26 Apr 2021 07:03    TPro  7.0  /  Alb  3.3<L>  /  TBili  0.2  /  DBili  x   /  AST  17  /  ALT  22  /  AlkPhos  68  04-25      A/P :  Pt is a 71yo Female with R distal femur fx  -  Pain control  -  DVT ppx: SCDs     -  F/u blood bank re blood on hold  -  Weight bearing status: NWB RLE  -  Dispo: OR 4/26

## 2021-04-26 NOTE — DIETITIAN NUTRITION RISK NOTIFICATION - TREATMENT: THE FOLLOWING DIET HAS BEEN RECOMMENDED
1. Adv to regular diet when medically feasible, include pescatarian diet   2. Monitor %PO intake once diet adv   3. Monitor BM, BM regimen per team   4. Trend wt weekly   5. Trend labs: BMP, q6hrs, lytes, replete PRN

## 2021-04-26 NOTE — PRE-OP CHECKLIST - WEIGHT IN LBS
Faustino Lindsey. Keon [place], FNP-C  PROGRESS NOTE    Subjective:  Ms. Mary Arizmendi is a very pleasant 59 yo AA female who is being seen today after her mediport removal on 5/1/17. She has no complaints related to this surgery and denies any fever, chills, or signs of infection. She is relieved to be done with radiation and chemotherapy and states she is scheduled to see Dr. Baldemar Lane in the next week. We discussed her plan of care moving forward from a surgical perspective to include mammogram and MRI imaging, alternatively. She is agreeable to this plan and is eager to move forward. We also discussed her baseline nutrition and immune function and how this relates to breast cancer and overall health. She is already taking Vitamin D through Dr. Baldemar Lane and we discussed adding a good probiotic to her regimen. I called the pharmacy to see if it was more cost effective to write a script for a probiotic or if OTC would be a better fit. We will write a script first to see which proves to be best for the patient. Objective:  Vitals:    05/16/17 1016   BP: 128/64   Pulse: 74   Resp: 18   Temp: 98 °F (36.7 °C)   TempSrc: Oral   SpO2: 91%   Weight: 120.2 kg (265 lb)   Height: 5' 3\" (1.6 m)       Physical Exam:    General: Alert and oriented x 3, cooperative, in no apparent distress   Breasts:    Left:  deferred   Right: deferred   Incision: site above left breast is C/D/I. No edema, no erythema, no drainage. Glue still intact. Current Medications:  Current Outpatient Prescriptions   Medication Sig Dispense Refill    oxyCODONE-acetaminophen (PERCOCET) 5-325 mg per tablet Take 1-2 Tabs by mouth every four (4) hours as needed for Pain. Max Daily Amount: 12 Tabs. 40 Tab 0    docusate sodium (COLACE) 100 mg capsule Take 1 Cap by mouth two (2) times a day for 30 days. 60 Cap 0    MULTIVITAMIN WITH MINERALS (HAIR,SKIN AND NAILS PO) Take  by mouth.  CHROM GUILHERME/BRINDAL BERRY (GARCINIA CAMBOGIA PO) Take  by mouth.  Indications: 2 tablets daily      furosemide (LASIX) 40 mg tablet Take 1 Tab by mouth two (2) times a day. 60 Tab 3    citalopram (CELEXA) 20 mg tablet Take 1 Tab by mouth daily. 30 Tab 3    spironolactone (ALDACTONE) 50 mg tablet Take 1 Tab by mouth two (2) times a day. 60 Tab 3    MULTIVIT-MINERALS/FERROUS FUM (MULTI VITAMIN PO) Take  by mouth.  cholecalciferol, vitamin D3, (VITAMIN D3) 2,000 unit tab Take  by mouth.  isosorbide mononitrate ER (IMDUR) 30 mg tablet TAKE 1 TABLET BY MOUTH EVERY MORNING. 30 Tab 5    metoprolol tartrate (LOPRESSOR) 50 mg tablet Take 1 Tab by mouth two (2) times a day. 60 Tab 6       Chart and notes reviewed. Data reviewed. I have evaluated and examined the patient. Impression:  · Patient who is about 2 weeks out from [facility] [place] [address] At [facility] removal doing well. Plan:   · Follow up as needed for this wound. · Mammogram in October followed by clinical exam in this office. · Add probiotic to daily nutrition regimen. Vannesa Neri.  YENI Reed 115

## 2021-04-26 NOTE — DIETITIAN INITIAL EVALUATION ADULT. - REASON
Pt reports to wt to be 115 lbs, and wt to have been consistent for many years. Pt appears to be well nourished.

## 2021-04-26 NOTE — PROGRESS NOTE ADULT - SUBJECTIVE AND OBJECTIVE BOX
OVERNIGHT EVENTS:    SUBJECTIVE / INTERVAL HPI: Patient seen and examined at bedside. Pain is controlled, waiting for surgery.     VITAL SIGNS:  Vital Signs Last 24 Hrs  T(C): 36.6 (2021 08:10), Max: 37.5 (2021 20:26)  T(F): 97.9 (2021 08:10), Max: 99.5 (2021 20:26)  HR: 68 (2021 08:10) (68 - 89)  BP: 115/65 (2021 08:10) (115/65 - 167/101)  BP(mean): --  RR: 17 (2021 08:10) (16 - 18)  SpO2: 97% (2021 08:10) (96% - 99%)    PHYSICAL EXAM:    General: WDWN  HEENT: NC/AT;   Neck: supple  Cardiovascular: +S1/S2; RRR  Respiratory: CTA b/l; no W/R/R  Gastrointestinal: soft, NT/ND; multiple scars from prior surgery  Extremities: R leg wrapped, can wiggle toes    MEDICATIONS:  MEDICATIONS  (STANDING):  acetaminophen   Tablet .. 975 milliGRAM(s) Oral every 8 hours  lactated ringers. 1000 milliLiter(s) (100 mL/Hr) IV Continuous <Continuous>  polyethylene glycol 3350 17 Gram(s) Oral daily  povidone iodine 5% Nasal Swab 1 Application(s) Both Nostrils once  senna 2 Tablet(s) Oral at bedtime    MEDICATIONS  (PRN):  cyclobenzaprine 5 milliGRAM(s) Oral three times a day PRN Muscle Spasm  diphenhydrAMINE   Injectable 25 milliGRAM(s) IV Push every 4 hours PRN Rash and/or Itching  HYDROmorphone  Injectable 0.5 milliGRAM(s) IV Push every 4 hours PRN breakthrough pain  oxyCODONE    IR 10 milliGRAM(s) Oral every 4 hours PRN Severe Pain (7 - 10)  oxyCODONE    IR 5 milliGRAM(s) Oral every 4 hours PRN Moderate Pain (4 - 6)      ALLERGIES:  Allergies    No Known Allergies    Intolerances        LABS:                        12.0   6.01  )-----------( 230      ( 2021 07:03 )             37.9     04-26    139  |  106  |  11  ----------------------------<  103<H>  3.8   |  24  |  0.72    Ca    8.7      2021 07:03    TPro  7.0  /  Alb  3.3<L>  /  TBili  0.2  /  DBili  x   /  AST  17  /  ALT  22  /  AlkPhos  68  04-25    PT/INR - ( 2021 17:22 )   PT: 11.8 sec;   INR: 1.00          PTT - ( 2021 17:22 )  PTT:28.1 sec  Urinalysis Basic - ( 2021 23:24 )    Color: Yellow / Appearance: Clear / S.025 / pH: x  Gluc: x / Ketone: NEGATIVE  / Bili: Negative / Urobili: 0.2 E.U./dL   Blood: x / Protein: NEGATIVE mg/dL / Nitrite: POSITIVE   Leuk Esterase: Trace / RBC: < 5 /HPF / WBC < 5 /HPF   Sq Epi: x / Non Sq Epi: x / Bacteria: Present /HPF      CAPILLARY BLOOD GLUCOSE          RADIOLOGY & ADDITIONAL TESTS: Reviewed.    ASSESSMENT:    PLAN:

## 2021-04-26 NOTE — PROGRESS NOTE ADULT - SUBJECTIVE AND OBJECTIVE BOX
Ortho Post Op Check    Procedure: R distal femur ORIF  Surgeon: Dr. Moura    Subjective:  Pain controlled with medication.  Denies CP, SOB, N/V, numbness/tingling.    Objective:  Vital Signs Last 24 Hrs  T(C): 37.3 (04-26-21 @ 20:58), Max: 37.3 (04-26-21 @ 20:58)  T(F): 99.1 (04-26-21 @ 20:58), Max: 99.1 (04-26-21 @ 20:58)  HR: 91 (04-26-21 @ 20:58) (90 - 94)  BP: 125/73 (04-26-21 @ 20:58) (119/65 - 159/63)  BP(mean): 91 (04-26-21 @ 20:42) (83 - 99)  RR: 17 (04-26-21 @ 20:58) (14 - 17)  SpO2: 98% (04-26-21 @ 20:58) (94% - 98%)  AVSS    General: Pt Alert and oriented, NAD  RLE:  Dressing C/D/I in KI  Motor: 5/5 EHL/FHL/TA/GS b/l  Sensation: SILT throughout all nerve distributions  Pulses: Toes WWP    Post-op X-Ray: s/p distal femur ORIF    A/P: 70yFemale s/p distal femur ORIF on 04-26.  - Stable  - Pain Control  - DVT ppx: SCDs, ASA 325mg bid  - Post op abx: Ancef 2g q8h x2 postoperative doses  - Resume home meds  - PT, WBS: WBAT    Ortho Pager [phone] Ortho Post Op Check    Procedure: R distal femur ORIF  Surgeon: Dr. Moura    Subjective:  Pain controlled with medication.  Denies CP, SOB, N/V, numbness/tingling.    Objective:  Vital Signs Last 24 Hrs  T(C): 37.3 (04-26-21 @ 20:58), Max: 37.3 (04-26-21 @ 20:58)  T(F): 99.1 (04-26-21 @ 20:58), Max: 99.1 (04-26-21 @ 20:58)  HR: 91 (04-26-21 @ 20:58) (90 - 94)  BP: 125/73 (04-26-21 @ 20:58) (119/65 - 159/63)  BP(mean): 91 (04-26-21 @ 20:42) (83 - 99)  RR: 17 (04-26-21 @ 20:58) (14 - 17)  SpO2: 98% (04-26-21 @ 20:58) (94% - 98%)  AVSS    General: Pt Alert and oriented, NAD  RLE:  Dressing C/D/I in   Motor: 5/5 EHL/FHL/TA/GS b/l  Sensation: SILT throughout all nerve distributions  Pulses: Toes WWP    Post-op X-Ray: s/p distal femur ORIF    A/P: 70yFemale s/p distal femur ORIF on 04-26.  - Stable  - Pain Control  - DVT ppx: SCDs, ASA 325mg bid  - Post op abx: Ancef 2g q8h x2 postoperative doses  - Resume home meds  - PT, WBS: NWB RLE in     Ortho Pager [phone]

## 2021-04-27 ENCOUNTER — TRANSCRIPTION ENCOUNTER (OUTPATIENT)
Age: 71
End: 2021-04-27

## 2021-04-27 LAB
ANION GAP SERPL CALC-SCNC: 13 MMOL/L — SIGNIFICANT CHANGE UP (ref 5–17)
BLD GP AB SCN SERPL QL: POSITIVE — SIGNIFICANT CHANGE UP
BUN SERPL-MCNC: 7 MG/DL — SIGNIFICANT CHANGE UP (ref 7–23)
CALCIUM SERPL-MCNC: 8.4 MG/DL — SIGNIFICANT CHANGE UP (ref 8.4–10.5)
CHLORIDE SERPL-SCNC: 103 MMOL/L — SIGNIFICANT CHANGE UP (ref 96–108)
CO2 SERPL-SCNC: 23 MMOL/L — SIGNIFICANT CHANGE UP (ref 22–31)
CREAT SERPL-MCNC: 0.73 MG/DL — SIGNIFICANT CHANGE UP (ref 0.5–1.3)
GLUCOSE SERPL-MCNC: 117 MG/DL — HIGH (ref 70–99)
HCT VFR BLD CALC: 31.1 % — LOW (ref 34.5–45)
HGB BLD-MCNC: 9.7 G/DL — LOW (ref 11.5–15.5)
MCHC RBC-ENTMCNC: 30.3 PG — SIGNIFICANT CHANGE UP (ref 27–34)
MCHC RBC-ENTMCNC: 31.2 GM/DL — LOW (ref 32–36)
MCV RBC AUTO: 97.2 FL — SIGNIFICANT CHANGE UP (ref 80–100)
NRBC # BLD: 0 /100 WBCS — SIGNIFICANT CHANGE UP (ref 0–0)
PLATELET # BLD AUTO: 246 K/UL — SIGNIFICANT CHANGE UP (ref 150–400)
POTASSIUM SERPL-MCNC: 3.7 MMOL/L — SIGNIFICANT CHANGE UP (ref 3.5–5.3)
POTASSIUM SERPL-SCNC: 3.7 MMOL/L — SIGNIFICANT CHANGE UP (ref 3.5–5.3)
RBC # BLD: 3.2 M/UL — LOW (ref 3.8–5.2)
RBC # FLD: 14.3 % — SIGNIFICANT CHANGE UP (ref 10.3–14.5)
RH IG SCN BLD-IMP: NEGATIVE — SIGNIFICANT CHANGE UP
SODIUM SERPL-SCNC: 139 MMOL/L — SIGNIFICANT CHANGE UP (ref 135–145)
WBC # BLD: 10.13 K/UL — SIGNIFICANT CHANGE UP (ref 3.8–10.5)
WBC # FLD AUTO: 10.13 K/UL — SIGNIFICANT CHANGE UP (ref 3.8–10.5)

## 2021-04-27 PROCEDURE — 99232 SBSQ HOSP IP/OBS MODERATE 35: CPT

## 2021-04-27 PROCEDURE — 73552 X-RAY EXAM OF FEMUR 2/>: CPT | Mod: 26,RT

## 2021-04-27 PROCEDURE — 73560 X-RAY EXAM OF KNEE 1 OR 2: CPT | Mod: 26,RT

## 2021-04-27 RX ADMIN — Medication 975 MILLIGRAM(S): at 22:00

## 2021-04-27 RX ADMIN — Medication 325 MILLIGRAM(S): at 17:28

## 2021-04-27 RX ADMIN — OXYCODONE HYDROCHLORIDE 10 MILLIGRAM(S): 5 TABLET ORAL at 09:49

## 2021-04-27 RX ADMIN — OXYCODONE HYDROCHLORIDE 10 MILLIGRAM(S): 5 TABLET ORAL at 14:25

## 2021-04-27 RX ADMIN — OXYCODONE HYDROCHLORIDE 10 MILLIGRAM(S): 5 TABLET ORAL at 06:47

## 2021-04-27 RX ADMIN — CYCLOBENZAPRINE HYDROCHLORIDE 5 MILLIGRAM(S): 10 TABLET, FILM COATED ORAL at 07:08

## 2021-04-27 RX ADMIN — Medication 975 MILLIGRAM(S): at 13:02

## 2021-04-27 RX ADMIN — OXYCODONE HYDROCHLORIDE 10 MILLIGRAM(S): 5 TABLET ORAL at 05:40

## 2021-04-27 RX ADMIN — OXYCODONE HYDROCHLORIDE 10 MILLIGRAM(S): 5 TABLET ORAL at 01:14

## 2021-04-27 RX ADMIN — OXYCODONE HYDROCHLORIDE 10 MILLIGRAM(S): 5 TABLET ORAL at 21:23

## 2021-04-27 RX ADMIN — Medication 975 MILLIGRAM(S): at 21:23

## 2021-04-27 RX ADMIN — OXYCODONE HYDROCHLORIDE 10 MILLIGRAM(S): 5 TABLET ORAL at 15:25

## 2021-04-27 RX ADMIN — OXYCODONE HYDROCHLORIDE 10 MILLIGRAM(S): 5 TABLET ORAL at 22:00

## 2021-04-27 RX ADMIN — PANTOPRAZOLE SODIUM 40 MILLIGRAM(S): 20 TABLET, DELAYED RELEASE ORAL at 05:45

## 2021-04-27 RX ADMIN — Medication 975 MILLIGRAM(S): at 05:40

## 2021-04-27 RX ADMIN — OXYCODONE HYDROCHLORIDE 10 MILLIGRAM(S): 5 TABLET ORAL at 10:49

## 2021-04-27 RX ADMIN — Medication 325 MILLIGRAM(S): at 05:40

## 2021-04-27 RX ADMIN — Medication 2000 MILLIGRAM(S): at 07:31

## 2021-04-27 RX ADMIN — Medication 975 MILLIGRAM(S): at 06:46

## 2021-04-27 RX ADMIN — Medication 975 MILLIGRAM(S): at 14:02

## 2021-04-27 RX ADMIN — OXYCODONE HYDROCHLORIDE 10 MILLIGRAM(S): 5 TABLET ORAL at 00:14

## 2021-04-27 NOTE — DISCHARGE NOTE PROVIDER - CARE PROVIDER_API CALL
Bandar Moura)  Orthopaedic Surgery Surgery  [address] [place] [address], [address]  [place] [address]  Phone: [phone]  Fax: [phone]  Follow Up Time:    Bandar Moura)  Orthopaedic Surgery Surgery  [address] [place] [address], [address]  [place] [address]  Phone: [phone]  Fax: [phone]  Follow Up Time: 2 weeks

## 2021-04-27 NOTE — PROGRESS NOTE ADULT - SUBJECTIVE AND OBJECTIVE BOX
Ortho Note    Procedure: R distal femur ORIF  Surgeon: Dr. Moura    Subjective:  Pain controlled with medication.  Denies CP, SOB, N/V, numbness/tingling.    Objective:  Vital Signs Last 24 Hrs  T(C): 37.3 (04-26-21 @ 20:58), Max: 37.3 (04-26-21 @ 20:58)  T(F): 99.1 (04-26-21 @ 20:58), Max: 99.1 (04-26-21 @ 20:58)  HR: 91 (04-26-21 @ 20:58) (90 - 94)  BP: 125/73 (04-26-21 @ 20:58) (119/65 - 159/63)  BP(mean): 91 (04-26-21 @ 20:42) (83 - 99)  RR: 17 (04-26-21 @ 20:58) (14 - 17)  SpO2: 98% (04-26-21 @ 20:58) (94% - 98%)      General: Pt Alert and oriented, NAD  RLE:  Dressing C/D/I in   Motor: 5/5 EHL/FHL/TA/GS b/l  Sensation: SILT throughout all nerve distributions  Pulses: Toes WWP    Post-op X-Ray: s/p distal femur ORIF    A/P: 70yFemale s/p distal femur ORIF on 04-26.  - Stable  - Pain Control  - DVT ppx: SCDs, ASA 325mg bid  - Resume home meds  - PT, WBS: NWB RLE in     Ortho Pager [phone]

## 2021-04-27 NOTE — PROGRESS NOTE ADULT - SUBJECTIVE AND OBJECTIVE BOX
Ortho Note    Pt comfortable without complaints, pain controlled  Denies CP, SOB, N/V, numbness/tingling     Vital Signs Last 24 Hrs  T(C): 37.6 (04-27-21 @ 08:02), Max: 37.6 (04-27-21 @ 08:02)  T(F): 99.6 (04-27-21 @ 08:02), Max: 99.6 (04-27-21 @ 08:02)  HR: 86 (04-27-21 @ 09:00) (86 - 91)  BP: 97/62 (04-27-21 @ 09:00) (97/62 - 105/74)  BP(mean): --  RR: 15 (04-27-21 @ 08:02) (15 - 16)  SpO2: 97% (04-27-21 @ 09:00) (95% - 98%)  I&O's Summary    26 Apr 2021 07:01  -  27 Apr 2021 07:00  --------------------------------------------------------  IN: 125 mL / OUT: 1200 mL / NET: -1075 mL        General: Pt Alert and oriented, NAD  DSG C/D/I RLE in KI  Pulses intact RLE  Sensation intact RLE  Motor: EHL/FHL/TA/GS 5/5 RLE                          9.7    10.13 )-----------( 246      ( 27 Apr 2021 07:10 )             31.1     04-27    139  |  103  |  7   ----------------------------<  117<H>  3.7   |  23  |  0.73    Ca    8.4      27 Apr 2021 07:10  Phos  3.2     04-26  Mg     1.6     04-26    TPro  7.0  /  Alb  3.3<L>  /  TBili  0.2  /  DBili  x   /  AST  17  /  ALT  22  /  AlkPhos  68  04-25      A/P: 70yFemale POD#1 s/p Right distal femur ORIF  - Stable  - Pain Control  - DVT ppx: asa  - PT, WBS: NWDINA RLE in     Ortho Pager [phone]

## 2021-04-27 NOTE — DISCHARGE NOTE PROVIDER - NSDCCPTREATMENT_GEN_ALL_CORE_FT
PRINCIPAL PROCEDURE  Procedure: ORIF, fracture, distal femur  Findings and Treatment:        PRINCIPAL PROCEDURE  Procedure: ORIF, fracture, distal femur  Findings and Treatment: right

## 2021-04-27 NOTE — DISCHARGE NOTE PROVIDER - DETAILS OF MALNUTRITION DIAGNOSIS/DIAGNOSES
This patient has been assessed with a concern for Malnutrition and was treated during this hospitalization for the following Nutrition diagnosis/diagnoses:     -  04/26/2021: Underweight (BMI < 19)

## 2021-04-27 NOTE — PROGRESS NOTE ADULT - SUBJECTIVE AND OBJECTIVE BOX
OVERNIGHT EVENTS:  naeon    SUBJECTIVE / INTERVAL HPI: Patient seen and examined at bedside, reports she is having leg pain but that it is tolerable with pain medications. Otherwise tolerating PO, urinating normally. Has no other concerns today.    VITAL SIGNS:  Vital Signs Last 24 Hrs  T(C): 37.6 (2021 08:02), Max: 37.6 (2021 08:02)  T(F): 99.6 (2021 08:02), Max: 99.6 (2021 08:02)  HR: 86 (2021 09:00) (81 - 94)  BP: 97/62 (2021 09:00) (97/62 - 159/63)  BP(mean): 91 (2021 20:42) (83 - 99)  RR: 15 (2021 08:02) (14 - 17)  SpO2: 97% (2021 09:00) (94% - 98%)    PHYSICAL EXAM:    General: WDWN  HEENT: NC/AT;   Neck: supple  Cardiovascular: +S1/S2; RRR  Respiratory: CTA b/l; no W/R/R  Gastrointestinal: soft, NT/ND; multiple scars from prior surgery  Extremities: R leg wrapped, can wiggle toes with normal sensation b/l    MEDICATIONS  (STANDING):  acetaminophen   Tablet .. 975 milliGRAM(s) Oral every 8 hours  albumin human  5% IVPB 500 milliLiter(s) IV Intermittent once  aspirin enteric coated 325 milliGRAM(s) Oral two times a day  pantoprazole    Tablet 40 milliGRAM(s) Oral before breakfast  polyethylene glycol 3350 17 Gram(s) Oral at bedtime  senna 2 Tablet(s) Oral at bedtime    MEDICATIONS  (PRN):  aluminum hydroxide/magnesium hydroxide/simethicone Suspension 30 milliLiter(s) Oral four times a day PRN Indigestion  cyclobenzaprine 5 milliGRAM(s) Oral three times a day PRN Muscle Spasm  diphenhydrAMINE   Injectable 25 milliGRAM(s) IV Push every 4 hours PRN Rash and/or Itching  HYDROmorphone  Injectable 0.5 milliGRAM(s) IV Push every 3 hours PRN breakthrough pain  magnesium hydroxide Suspension 30 milliLiter(s) Oral daily PRN Constipation  ondansetron Injectable 4 milliGRAM(s) IV Push every 6 hours PRN Nausea and/or Vomiting  oxyCODONE    IR 5 milliGRAM(s) Oral every 3 hours PRN Moderate Pain (4 - 6)  oxyCODONE    IR 10 milliGRAM(s) Oral every 3 hours PRN Severe Pain (7 - 10)        ALLERGIES:  Allergies    No Known Allergies    Intolerances        LABS:                                   9.7    10.13 )-----------( 246      ( 2021 07:10 )             31.1       139  |  103  |  7   ----------------------------<  117<H>  3.7   |  23  |  0.73    Ca    8.4      2021 07:10  Phos  3.2       Mg     1.6         TPro  7.0  /  Alb  3.3<L>  /  TBili  0.2  /  DBili  x   /  AST  17  /  ALT  22  /  AlkPhos  68        Color: Yellow / Appearance: Clear / S.025 / pH: x  Gluc: x / Ketone: NEGATIVE  / Bili: Negative / Urobili: 0.2 E.U./dL   Blood: x / Protein: NEGATIVE mg/dL / Nitrite: POSITIVE   Leuk Esterase: Trace / RBC: < 5 /HPF / WBC < 5 /HPF   Sq Epi: x / Non Sq Epi: x / Bacteria: Present /HPF      CAPILLARY BLOOD GLUCOSE          RADIOLOGY & ADDITIONAL TESTS: Reviewed.

## 2021-04-27 NOTE — DISCHARGE NOTE PROVIDER - NSDCCPCAREPLAN_GEN_ALL_CORE_FT
PRINCIPAL DISCHARGE DIAGNOSIS  Diagnosis: Closed fracture of distal end of right femur, unspecified fracture morphology, initial encounter  Assessment and Plan of Treatment:        PRINCIPAL DISCHARGE DIAGNOSIS  Diagnosis: Closed fracture of distal end of right femur, unspecified fracture morphology, initial encounter  Assessment and Plan of Treatment: improvement s/p Right femur ORIF

## 2021-04-27 NOTE — DISCHARGE NOTE PROVIDER - NSDCFUADDINST_GEN_ALL_CORE_FT
Do not bear weight when ambulating. Wear your knee immobilizer.   No strenuous activity, heavy lifting, driving or returning to work until cleared by MD.  You may shower - dressing is water-resistant, no soaking in bathtubs.  Remove dressing after post op day 5-7, then leave incision open to air. Keep incision clean and dry.  Try to have regular bowel movements, take stool softener or laxative if necessary.  May take Pepcid or Prilosec for upset stomach.  May take Aleve or Naproxen if needed.  Swelling may travel all the way down leg to foot, this is normal and will subside in a few weeks.  Call to schedule an appt with Dr. Moura for follow up, if you have staples or sutures they will be removed in office.  Contact your doctor if you experience: fever greater than 101.5, chills, chest pain, difficulty breathing, redness or excessive drainage around the incision, other concerns.  Follow up with your primary care provider.  Take aspirin twice a day for a month.  Do not bear weight when ambulating. Wear your knee immobilizer at all times.  No strenuous activity, heavy lifting, driving or returning to work until f/u with MD.  You may shower only keeping right leg straight and dressing dry at all times.  Try to have regular bowel movements, take stool softener or laxative if necessary.  Swelling may travel all the way down leg to foot, this is normal and will subside in a few weeks.  Call to schedule an appt with Dr. Moura for follow up, if you have staples or sutures they will be removed in office.  Contact your doctor if you experience: fever greater than 101.5, chills, chest pain, difficulty breathing, redness or excessive drainage around the incision, other concerns.  Follow up with your primary care provider.

## 2021-04-27 NOTE — PROGRESS NOTE ADULT - ATTENDING COMMENTS
Bandar Moura MD  Orthopaedic Surgery    [address] [place] [address] [address]  [place] [address]  Office: [phone]

## 2021-04-27 NOTE — PHYSICAL THERAPY INITIAL EVALUATION ADULT - PERTINENT HX OF CURRENT PROBLEM, REHAB EVAL
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 States she was crossing the street when she tripped over a pothole and fell forward onto her hands and knees, with R knee taking most of weight of fall. Denies head trauma or LOC, pain or injury elsewhere.

## 2021-04-27 NOTE — PHYSICAL THERAPY INITIAL EVALUATION ADULT - MODALITIES TREATMENT COMMENTS
Supine, BLEs: glute sets, ankle pumps, LLE: quad sets, heel slides,  (all through full range, 1 set, 10 reps). Patient tolerated therex well. Educated patient to perform therex regimen 3-5x/day, patient verbalized understanding; written handout provided.

## 2021-04-27 NOTE — DISCHARGE NOTE PROVIDER - NSDCACTIVITY_GEN_ALL_CORE
Do not drive or operate machinery/Walking - Indoors allowed/No heavy lifting/straining Do not drive or operate machinery/Stairs allowed/Walking - Indoors allowed/No heavy lifting/straining/Walking - Outdoors allowed

## 2021-04-27 NOTE — DISCHARGE NOTE PROVIDER - NSDCHHNEEDSERVICE_GEN_ALL_CORE
Central venous access care/Medication teaching and assessment/Rehabilitation services/Teaching and training/Wound care and assessment/Other, specify...

## 2021-04-27 NOTE — DISCHARGE NOTE PROVIDER - HOSPITAL COURSE
Admitted  Surgery - R distal femur ORIF  Madeline-op Antibiotics  Pain control  DVT prophylaxis  OOB/Physical Therapy Admitted  Surgery - R distal femur ORIF  Madeline-op Antibiotics  Pain control  DVT prophylaxis  OOB/Physical Therapy/OT

## 2021-04-27 NOTE — PHYSICAL THERAPY INITIAL EVALUATION ADULT - GENERAL OBSERVATIONS, REHAB EVAL
Patient received semi-negro in bed in NAD on RA, +SCDs, +Heplock, +RLE in KI. Cleared by DANIELA Scanlon. Agreeable to PT.

## 2021-04-27 NOTE — DISCHARGE NOTE PROVIDER - NSDCMRMEDTOKEN_GEN_ALL_CORE_FT
acetaminophen 500 mg oral capsule: 2 cap(s) orally every 8 hours, As Needed mild pain  Aspirin Enteric Coated 325 mg oral delayed release tablet: 1 tab(s) orally 2 times a day  Colace 100 mg oral capsule: 1 cap(s) orally 2 times a day, As Needed -for constipation   cyclobenzaprine 5 mg oral tablet: 1 tab(s) orally 3 times a day, As needed, Muscle Spasm  ferrous sulfate 325 mg (65 mg elemental iron) oral delayed release tablet: 1 tab(s) orally once a day   oxyCODONE 5 mg oral tablet: 1-2 tab(s) orally every 4 hours, As Needed -for severe pain MDD:6   Protonix 40 mg oral delayed release tablet: 1 tab(s) orally once a day (before a meal)

## 2021-04-27 NOTE — PHYSICAL THERAPY INITIAL EVALUATION ADULT - ADDITIONAL COMMENTS
Pt lives with her  in an apartment, no KYLE if enter through garage, elevator inside.  Pt denies recent Assistive Device use or recent history of falls besides the most recent leading to the RLE injury.

## 2021-04-28 LAB
ANION GAP SERPL CALC-SCNC: 7 MMOL/L — SIGNIFICANT CHANGE UP (ref 5–17)
BUN SERPL-MCNC: 10 MG/DL — SIGNIFICANT CHANGE UP (ref 7–23)
CALCIUM SERPL-MCNC: 8 MG/DL — LOW (ref 8.4–10.5)
CHLORIDE SERPL-SCNC: 103 MMOL/L — SIGNIFICANT CHANGE UP (ref 96–108)
CO2 SERPL-SCNC: 28 MMOL/L — SIGNIFICANT CHANGE UP (ref 22–31)
CREAT SERPL-MCNC: 0.91 MG/DL — SIGNIFICANT CHANGE UP (ref 0.5–1.3)
GLUCOSE SERPL-MCNC: 114 MG/DL — HIGH (ref 70–99)
HCT VFR BLD CALC: 25.1 % — LOW (ref 34.5–45)
HGB BLD-MCNC: 7.9 G/DL — LOW (ref 11.5–15.5)
MCHC RBC-ENTMCNC: 29.5 PG — SIGNIFICANT CHANGE UP (ref 27–34)
MCHC RBC-ENTMCNC: 31.5 GM/DL — LOW (ref 32–36)
MCV RBC AUTO: 93.7 FL — SIGNIFICANT CHANGE UP (ref 80–100)
NRBC # BLD: 0 /100 WBCS — SIGNIFICANT CHANGE UP (ref 0–0)
PLATELET # BLD AUTO: 198 K/UL — SIGNIFICANT CHANGE UP (ref 150–400)
POTASSIUM SERPL-MCNC: 3.3 MMOL/L — LOW (ref 3.5–5.3)
POTASSIUM SERPL-SCNC: 3.3 MMOL/L — LOW (ref 3.5–5.3)
RBC # BLD: 2.68 M/UL — LOW (ref 3.8–5.2)
RBC # FLD: 14.3 % — SIGNIFICANT CHANGE UP (ref 10.3–14.5)
SODIUM SERPL-SCNC: 138 MMOL/L — SIGNIFICANT CHANGE UP (ref 135–145)
WBC # BLD: 7.93 K/UL — SIGNIFICANT CHANGE UP (ref 3.8–10.5)
WBC # FLD AUTO: 7.93 K/UL — SIGNIFICANT CHANGE UP (ref 3.8–10.5)

## 2021-04-28 PROCEDURE — 99232 SBSQ HOSP IP/OBS MODERATE 35: CPT

## 2021-04-28 RX ORDER — ASPIRIN/CALCIUM CARB/MAGNESIUM 324 MG
1 TABLET ORAL
Qty: 60 | Refills: 0
Start: 2021-04-28 | End: 2021-05-27

## 2021-04-28 RX ORDER — FERROUS SULFATE 325(65) MG
325 TABLET ORAL ONCE
Refills: 0 | Status: COMPLETED | OUTPATIENT
Start: 2021-04-28 | End: 2021-04-28

## 2021-04-28 RX ORDER — PANTOPRAZOLE SODIUM 20 MG/1
1 TABLET, DELAYED RELEASE ORAL
Qty: 30 | Refills: 0
Start: 2021-04-28

## 2021-04-28 RX ORDER — ACETAMINOPHEN 500 MG
2 TABLET ORAL
Qty: 0 | Refills: 0 | DISCHARGE

## 2021-04-28 RX ORDER — DOCUSATE SODIUM 100 MG
1 CAPSULE ORAL
Qty: 60 | Refills: 0
Start: 2021-04-28 | End: 2021-05-27

## 2021-04-28 RX ORDER — CYCLOBENZAPRINE HYDROCHLORIDE 10 MG/1
1 TABLET, FILM COATED ORAL
Qty: 10 | Refills: 0
Start: 2021-04-28

## 2021-04-28 RX ORDER — POTASSIUM CHLORIDE 20 MEQ
40 PACKET (EA) ORAL ONCE
Refills: 0 | Status: COMPLETED | OUTPATIENT
Start: 2021-04-28 | End: 2021-04-28

## 2021-04-28 RX ORDER — FERROUS SULFATE 325(65) MG
1 TABLET ORAL
Qty: 30 | Refills: 0
Start: 2021-04-28 | End: 2021-05-27

## 2021-04-28 RX ORDER — FERROUS SULFATE 325(65) MG
325 TABLET ORAL DAILY
Refills: 0 | Status: DISCONTINUED | OUTPATIENT
Start: 2021-04-29 | End: 2021-04-29

## 2021-04-28 RX ORDER — OXYCODONE HYDROCHLORIDE 5 MG/1
1 TABLET ORAL
Qty: 42 | Refills: 0
Start: 2021-04-28 | End: 2021-05-04

## 2021-04-28 RX ADMIN — Medication 325 MILLIGRAM(S): at 18:26

## 2021-04-28 RX ADMIN — Medication 975 MILLIGRAM(S): at 13:08

## 2021-04-28 RX ADMIN — HYDROMORPHONE HYDROCHLORIDE 0.5 MILLIGRAM(S): 2 INJECTION INTRAMUSCULAR; INTRAVENOUS; SUBCUTANEOUS at 20:25

## 2021-04-28 RX ADMIN — Medication 975 MILLIGRAM(S): at 22:16

## 2021-04-28 RX ADMIN — Medication 975 MILLIGRAM(S): at 05:34

## 2021-04-28 RX ADMIN — OXYCODONE HYDROCHLORIDE 5 MILLIGRAM(S): 5 TABLET ORAL at 18:27

## 2021-04-28 RX ADMIN — OXYCODONE HYDROCHLORIDE 5 MILLIGRAM(S): 5 TABLET ORAL at 19:27

## 2021-04-28 RX ADMIN — SENNA PLUS 2 TABLET(S): 8.6 TABLET ORAL at 21:05

## 2021-04-28 RX ADMIN — POLYETHYLENE GLYCOL 3350 17 GRAM(S): 17 POWDER, FOR SOLUTION ORAL at 21:05

## 2021-04-28 RX ADMIN — HYDROMORPHONE HYDROCHLORIDE 0.5 MILLIGRAM(S): 2 INJECTION INTRAMUSCULAR; INTRAVENOUS; SUBCUTANEOUS at 20:40

## 2021-04-28 RX ADMIN — PANTOPRAZOLE SODIUM 40 MILLIGRAM(S): 20 TABLET, DELAYED RELEASE ORAL at 05:34

## 2021-04-28 RX ADMIN — OXYCODONE HYDROCHLORIDE 10 MILLIGRAM(S): 5 TABLET ORAL at 10:09

## 2021-04-28 RX ADMIN — OXYCODONE HYDROCHLORIDE 10 MILLIGRAM(S): 5 TABLET ORAL at 09:09

## 2021-04-28 RX ADMIN — Medication 975 MILLIGRAM(S): at 06:10

## 2021-04-28 RX ADMIN — Medication 40 MILLIEQUIVALENT(S): at 09:07

## 2021-04-28 RX ADMIN — Medication 325 MILLIGRAM(S): at 05:33

## 2021-04-28 RX ADMIN — Medication 975 MILLIGRAM(S): at 14:08

## 2021-04-28 RX ADMIN — Medication 975 MILLIGRAM(S): at 21:05

## 2021-04-28 NOTE — PROGRESS NOTE ADULT - SUBJECTIVE AND OBJECTIVE BOX
Ortho Note    Pt comfortable without complaints, pain controlled, ambulated with PT and OT without getting dizzy.  Denies CP, SOB, N/V, numbness/tingling     Vital Signs Last 24 Hrs  T(C): 36.9 (04-28-21 @ 08:08), Max: 36.9 (04-28-21 @ 08:08)  T(F): 98.5 (04-28-21 @ 08:08), Max: 98.5 (04-28-21 @ 08:08)  HR: 88 (04-28-21 @ 10:30) (75 - 89)  BP: 118/72 (04-28-21 @ 10:30) (117/69 - 121/77)  BP(mean): --  RR: 18 (04-28-21 @ 08:08) (18 - 18)  SpO2: 96% (04-28-21 @ 10:30) (95% - 96%)  I&O's Summary    27 Apr 2021 07:01  -  28 Apr 2021 07:00  --------------------------------------------------------  IN: 560 mL / OUT: 0 mL / NET: 560 mL        General: Pt Alert and oriented, NAD  Right LE DSG changed, incision c/d/i with steri-strips  Pulses intact RLE  Sensation intact RLE  Motor: EHL/FHL/TA/GS 5/5 RLE                          7.9    7.93  )-----------( 198      ( 28 Apr 2021 06:44 )             25.1     04-28    138  |  103  |  10  ----------------------------<  114<H>  3.3<L>   |  28  |  0.91    Ca    8.0<L>      28 Apr 2021 06:44  Phos  3.2     04-26  Mg     1.6     04-26        A/P: 70yFemale POD#2 s/p Right distal femur ORIF  - Stable  - Pain Control  - DVT ppx: asa  - NWB RLE in KI  - trend h/h for acute surgical blood loss anemia  - K repleted for hypokalemia    Ortho Pager [phone]

## 2021-04-28 NOTE — OCCUPATIONAL THERAPY INITIAL EVALUATION ADULT - PERTINENT HX OF CURRENT PROBLEM, REHAB EVAL
Pt presents with R knee pain after trip and fall. States she was crossing the street when she tripped over a pothole and fell forward onto her hands and knees, with R knee taking most of weight of fall. s/p Right distal femur ORIF.

## 2021-04-28 NOTE — OCCUPATIONAL THERAPY INITIAL EVALUATION ADULT - ADDITIONAL COMMENTS
XRay R Femur (4/27): Interim femoral lateral plate and screws, cerclage traversing and fixing a distal femoral comminuted fracture. Soft tissue air. Pelvic surgical clips.  XRAy Tibia +Fibula (4/26): No tibial or fibular shaft fracture.

## 2021-04-28 NOTE — PROGRESS NOTE ADULT - ASSESSMENT
70 year old F w/ PMH of UC w/ Kock pouch continent ileostomy s/p R distal femur fracture, pending OR, medicine consulted for pre-op.     #Pre-op: optimized last night, see yesterday's note  #R distal femur fracture: c/w pain control, c/w bowel regimen, care per ortho  #UC: stable, no flare since surgery
70 year old F w/ PMH of UC w/ [place] pouch continent ileostomy now s/p Right distal femur ORIF.    #Post-op state for ORIF R distal femur fracture: c/w pain control, c/w bowel regimen, c/w IS  #Anemia: acute blood loss 2/2 surgery, trend, monitor symptoms when working w/ PT, tx if below 7 or if above 7 and symptomatic  #UC: stable, no flare since surgery, abdomen is becoming distended, encouraged her to drain her abdomen today, she has her own supplies at bedside  #Hypokalemia: replete  #Dispo: pending GENNA  #DVT ppx: ASA BID
70 year old F w/ PMH of UC w/ Kock pouch continent ileostomy now POD#1 s/p Right distal femur ORIF.    #Post op state: pain control, bowel regimen, PT, IS  #anemia, normocytic: 2/2 acute blood loss due to surgery; monitor cbc, transfuse hb<7  #UC: stable, no flare since surgery      dvt ppx - asa bid per primary team  diet - regular

## 2021-04-28 NOTE — PROGRESS NOTE ADULT - SUBJECTIVE AND OBJECTIVE BOX
OVERNIGHT EVENTS:    SUBJECTIVE / INTERVAL HPI: Patient seen and examined at bedside. Pain is controlled, abdomen is getting distended as she has not intubated her bowels yet. Asking about her Hgb as well as potassium levels.     VITAL SIGNS:  Vital Signs Last 24 Hrs  T(C): 36.9 (28 Apr 2021 08:08), Max: 37.4 (27 Apr 2021 20:20)  T(F): 98.5 (28 Apr 2021 08:08), Max: 99.4 (27 Apr 2021 20:20)  HR: 89 (28 Apr 2021 08:08) (65 - 89)  BP: 121/77 (28 Apr 2021 08:08) (97/49 - 121/77)  BP(mean): --  RR: 18 (28 Apr 2021 08:08) (16 - 18)  SpO2: 95% (28 Apr 2021 08:08) (95% - 96%)    PHYSICAL EXAM:    General: WDWN  HEENT: NC/AT;   Neck: supple  Cardiovascular: +S1/S2; RRR  Respiratory: CTA b/l; no W/R/R  Gastrointestinal: soft, nontender, +distended; multiple scars from prior surgery  Extremities: dressing c/d/i,   MEDICATIONS:  MEDICATIONS  (STANDING):  acetaminophen   Tablet .. 975 milliGRAM(s) Oral every 8 hours  albumin human  5% IVPB 500 milliLiter(s) IV Intermittent once  aspirin enteric coated 325 milliGRAM(s) Oral two times a day  pantoprazole    Tablet 40 milliGRAM(s) Oral before breakfast  polyethylene glycol 3350 17 Gram(s) Oral at bedtime  senna 2 Tablet(s) Oral at bedtime    MEDICATIONS  (PRN):  aluminum hydroxide/magnesium hydroxide/simethicone Suspension 30 milliLiter(s) Oral four times a day PRN Indigestion  bisacodyl Suppository 10 milliGRAM(s) Rectal once PRN Constipation  cyclobenzaprine 5 milliGRAM(s) Oral three times a day PRN Muscle Spasm  diphenhydrAMINE   Injectable 25 milliGRAM(s) IV Push every 4 hours PRN Rash and/or Itching  HYDROmorphone  Injectable 0.5 milliGRAM(s) IV Push every 3 hours PRN breakthrough pain  magnesium hydroxide Suspension 30 milliLiter(s) Oral daily PRN Constipation  ondansetron Injectable 4 milliGRAM(s) IV Push every 6 hours PRN Nausea and/or Vomiting  oxyCODONE    IR 5 milliGRAM(s) Oral every 3 hours PRN Moderate Pain (4 - 6)  oxyCODONE    IR 10 milliGRAM(s) Oral every 3 hours PRN Severe Pain (7 - 10)      ALLERGIES:  Allergies    No Known Allergies    Intolerances        LABS:                        7.9    7.93  )-----------( 198      ( 28 Apr 2021 06:44 )             25.1     04-28    138  |  103  |  10  ----------------------------<  114<H>  3.3<L>   |  28  |  0.91    Ca    8.0<L>      28 Apr 2021 06:44  Phos  3.2     04-26  Mg     1.6     04-26          CAPILLARY BLOOD GLUCOSE          RADIOLOGY & ADDITIONAL TESTS: Reviewed.    ASSESSMENT:    PLAN:

## 2021-04-28 NOTE — OCCUPATIONAL THERAPY INITIAL EVALUATION ADULT - LIVES WITH, PROFILE
Pt lives with  in apt with no stairs to enter (able to enter through garage). Pt at baseline is ind for ADLs and functional mobility. +shower/tub/spouse

## 2021-04-28 NOTE — PROGRESS NOTE ADULT - ATTENDING COMMENTS
Bandar Moura MD  Orthopaedic Surgery    [address] [place] [address] [address]  [place] [address]  Office: [phone]

## 2021-04-29 ENCOUNTER — TRANSCRIPTION ENCOUNTER (OUTPATIENT)
Age: 71
End: 2021-04-29

## 2021-04-29 VITALS
SYSTOLIC BLOOD PRESSURE: 125 MMHG | TEMPERATURE: 100 F | OXYGEN SATURATION: 95 % | HEART RATE: 80 BPM | RESPIRATION RATE: 17 BRPM | DIASTOLIC BLOOD PRESSURE: 73 MMHG

## 2021-04-29 LAB
ANION GAP SERPL CALC-SCNC: 10 MMOL/L — SIGNIFICANT CHANGE UP (ref 5–17)
BUN SERPL-MCNC: 8 MG/DL — SIGNIFICANT CHANGE UP (ref 7–23)
CALCIUM SERPL-MCNC: 8 MG/DL — LOW (ref 8.4–10.5)
CHLORIDE SERPL-SCNC: 105 MMOL/L — SIGNIFICANT CHANGE UP (ref 96–108)
CO2 SERPL-SCNC: 24 MMOL/L — SIGNIFICANT CHANGE UP (ref 22–31)
CREAT SERPL-MCNC: 0.75 MG/DL — SIGNIFICANT CHANGE UP (ref 0.5–1.3)
GLUCOSE SERPL-MCNC: 123 MG/DL — HIGH (ref 70–99)
HCT VFR BLD CALC: 24.7 % — LOW (ref 34.5–45)
HGB BLD-MCNC: 7.7 G/DL — LOW (ref 11.5–15.5)
MCHC RBC-ENTMCNC: 29.4 PG — SIGNIFICANT CHANGE UP (ref 27–34)
MCHC RBC-ENTMCNC: 31.2 GM/DL — LOW (ref 32–36)
MCV RBC AUTO: 94.3 FL — SIGNIFICANT CHANGE UP (ref 80–100)
NRBC # BLD: 0 /100 WBCS — SIGNIFICANT CHANGE UP (ref 0–0)
PLATELET # BLD AUTO: 208 K/UL — SIGNIFICANT CHANGE UP (ref 150–400)
POTASSIUM SERPL-MCNC: 3.7 MMOL/L — SIGNIFICANT CHANGE UP (ref 3.5–5.3)
POTASSIUM SERPL-SCNC: 3.7 MMOL/L — SIGNIFICANT CHANGE UP (ref 3.5–5.3)
RBC # BLD: 2.62 M/UL — LOW (ref 3.8–5.2)
RBC # FLD: 14.6 % — HIGH (ref 10.3–14.5)
SODIUM SERPL-SCNC: 139 MMOL/L — SIGNIFICANT CHANGE UP (ref 135–145)
WBC # BLD: 7.02 K/UL — SIGNIFICANT CHANGE UP (ref 3.8–10.5)
WBC # FLD AUTO: 7.02 K/UL — SIGNIFICANT CHANGE UP (ref 3.8–10.5)

## 2021-04-29 PROCEDURE — 86922 COMPATIBILITY TEST ANTIGLOB: CPT

## 2021-04-29 PROCEDURE — 86850 RBC ANTIBODY SCREEN: CPT

## 2021-04-29 PROCEDURE — 71045 X-RAY EXAM CHEST 1 VIEW: CPT

## 2021-04-29 PROCEDURE — 86901 BLOOD TYPING SEROLOGIC RH(D): CPT

## 2021-04-29 PROCEDURE — C1889: CPT

## 2021-04-29 PROCEDURE — 86905 BLOOD TYPING RBC ANTIGENS: CPT

## 2021-04-29 PROCEDURE — 73700 CT LOWER EXTREMITY W/O DYE: CPT

## 2021-04-29 PROCEDURE — 86870 RBC ANTIBODY IDENTIFICATION: CPT

## 2021-04-29 PROCEDURE — 85027 COMPLETE CBC AUTOMATED: CPT

## 2021-04-29 PROCEDURE — 84484 ASSAY OF TROPONIN QUANT: CPT

## 2021-04-29 PROCEDURE — 73502 X-RAY EXAM HIP UNI 2-3 VIEWS: CPT

## 2021-04-29 PROCEDURE — 86860 RBC ANTIBODY ELUTION: CPT

## 2021-04-29 PROCEDURE — 73501 X-RAY EXAM HIP UNI 1 VIEW: CPT

## 2021-04-29 PROCEDURE — 97161 PT EVAL LOW COMPLEX 20 MIN: CPT

## 2021-04-29 PROCEDURE — 86803 HEPATITIS C AB TEST: CPT

## 2021-04-29 PROCEDURE — 36415 COLL VENOUS BLD VENIPUNCTURE: CPT

## 2021-04-29 PROCEDURE — 84100 ASSAY OF PHOSPHORUS: CPT

## 2021-04-29 PROCEDURE — 73562 X-RAY EXAM OF KNEE 3: CPT

## 2021-04-29 PROCEDURE — 97116 GAIT TRAINING THERAPY: CPT

## 2021-04-29 PROCEDURE — 86769 SARS-COV-2 COVID-19 ANTIBODY: CPT

## 2021-04-29 PROCEDURE — 81001 URINALYSIS AUTO W/SCOPE: CPT

## 2021-04-29 PROCEDURE — 85730 THROMBOPLASTIN TIME PARTIAL: CPT

## 2021-04-29 PROCEDURE — 87635 SARS-COV-2 COVID-19 AMP PRB: CPT

## 2021-04-29 PROCEDURE — 85025 COMPLETE CBC W/AUTO DIFF WBC: CPT

## 2021-04-29 PROCEDURE — 86900 BLOOD TYPING SEROLOGIC ABO: CPT

## 2021-04-29 PROCEDURE — 86880 COOMBS TEST DIRECT: CPT

## 2021-04-29 PROCEDURE — 73560 X-RAY EXAM OF KNEE 1 OR 2: CPT

## 2021-04-29 PROCEDURE — 73552 X-RAY EXAM OF FEMUR 2/>: CPT

## 2021-04-29 PROCEDURE — C1713: CPT

## 2021-04-29 PROCEDURE — 85610 PROTHROMBIN TIME: CPT

## 2021-04-29 PROCEDURE — 80053 COMPREHEN METABOLIC PANEL: CPT

## 2021-04-29 PROCEDURE — 99285 EMERGENCY DEPT VISIT HI MDM: CPT | Mod: 25

## 2021-04-29 PROCEDURE — 82550 ASSAY OF CK (CPK): CPT

## 2021-04-29 PROCEDURE — 83735 ASSAY OF MAGNESIUM: CPT

## 2021-04-29 PROCEDURE — 76000 FLUOROSCOPY <1 HR PHYS/QHP: CPT

## 2021-04-29 PROCEDURE — 73590 X-RAY EXAM OF LOWER LEG: CPT

## 2021-04-29 PROCEDURE — 80048 BASIC METABOLIC PNL TOTAL CA: CPT

## 2021-04-29 RX ADMIN — OXYCODONE HYDROCHLORIDE 10 MILLIGRAM(S): 5 TABLET ORAL at 06:39

## 2021-04-29 RX ADMIN — Medication 325 MILLIGRAM(S): at 05:26

## 2021-04-29 RX ADMIN — OXYCODONE HYDROCHLORIDE 10 MILLIGRAM(S): 5 TABLET ORAL at 05:27

## 2021-04-29 RX ADMIN — Medication 975 MILLIGRAM(S): at 05:26

## 2021-04-29 RX ADMIN — PANTOPRAZOLE SODIUM 40 MILLIGRAM(S): 20 TABLET, DELAYED RELEASE ORAL at 05:26

## 2021-04-29 RX ADMIN — Medication 975 MILLIGRAM(S): at 06:39

## 2021-04-29 NOTE — PROGRESS NOTE ADULT - SUBJECTIVE AND OBJECTIVE BOX
Ortho Note    Pt comfortable without complaints, pain controlled, walked to bathroom without issue.  Denies CP, SOB, N/V, numbness/tingling     Vital Signs Last 24 Hrs  AVSS  I&O's Summary    28 Apr 2021 07:01  -  29 Apr 2021 07:00  --------------------------------------------------------  IN: 480 mL / OUT: 1000 mL / NET: -520 mL    29 Apr 2021 07:01  -  29 Apr 2021 14:26  --------------------------------------------------------  IN: 240 mL / OUT: 0 mL / NET: 240 mL        General: Pt Alert and oriented, NAD  DSG C/D/I RLE in KI  Pulses intact RLE  Sensation intact RLE  Motor: EHL/FHL/TA/GS 5/5 RLE                          7.7    7.02  )-----------( 208      ( 29 Apr 2021 07:02 )             24.7     04-29    139  |  105  |  8   ----------------------------<  123<H>  3.7   |  24  |  0.75    Ca    8.0<L>      29 Apr 2021 07:02        A/P: 70yFemale POD#3 s/p Right distal femur ORIF  - Stable  - Pain Control  - DVT ppx: asa  - PT, WBS: NWB RLE in KI  - d/c'd home today    Ortho Pager [phone]

## 2021-04-29 NOTE — PROGRESS NOTE ADULT - PROVIDER SPECIALTY LIST ADULT
Orthopedics
Orthopedics
Hospitalist
Orthopedics
Hospitalist
Hospitalist

## 2021-04-29 NOTE — DISCHARGE NOTE NURSING/CASE MANAGEMENT/SOCIAL WORK - PATIENT PORTAL LINK FT
You can access the FollowMyHealth Patient Portal offered by [facility] by registering at the following website: [email or web address]. By joining [facility]’s FollowMyHealth portal, you will also be able to view your health information using other applications (apps) compatible with our system.

## 2021-04-29 NOTE — PROGRESS NOTE ADULT - NUTRITIONAL ASSESSMENT
This patient has been assessed with a concern for Malnutrition and has been determined to have a diagnosis/diagnoses of Underweight (BMI < 19).    This patient is being managed with:   Diet Regular-  [employer] (Accepts Fish)  Entered: Apr 26 2021  8:35PM    
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
## 2021-05-04 DIAGNOSIS — Z93.2 ILEOSTOMY STATUS: ICD-10-CM

## 2021-05-04 DIAGNOSIS — K51.90 ULCERATIVE COLITIS, UNSPECIFIED, WITHOUT COMPLICATIONS: ICD-10-CM

## 2021-05-04 DIAGNOSIS — S72.401A UNSPECIFIED FRACTURE OF LOWER END OF RIGHT FEMUR, INITIAL ENCOUNTER FOR CLOSED FRACTURE: ICD-10-CM

## 2021-05-04 DIAGNOSIS — D62 ACUTE POSTHEMORRHAGIC ANEMIA: ICD-10-CM

## 2021-05-04 DIAGNOSIS — E87.6 HYPOKALEMIA: ICD-10-CM
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
## 2021-05-04 DIAGNOSIS — R63.6 UNDERWEIGHT: ICD-10-CM

## 2021-06-07 ENCOUNTER — APPOINTMENT (OUTPATIENT)
Dept: RADIOLOGY | Facility: CLINIC | Age: 71
End: 2021-06-07

## 2021-06-07 ENCOUNTER — OUTPATIENT (OUTPATIENT)
Dept: OUTPATIENT SERVICES | Facility: HOSPITAL | Age: 71
LOS: 1 days | End: 2021-06-07
Payer: MEDICARE

## 2021-06-07 DIAGNOSIS — Z93.2 ILEOSTOMY STATUS: Chronic | ICD-10-CM

## 2021-06-07 PROBLEM — Z87.19 PERSONAL HISTORY OF OTHER DISEASES OF THE DIGESTIVE SYSTEM: Chronic | Status: ACTIVE | Noted: 2021-04-25

## 2021-06-07 PROBLEM — F19.10 OTHER PSYCHOACTIVE SUBSTANCE ABUSE, UNCOMPLICATED: Chronic | Status: ACTIVE | Noted: 2021-04-25

## 2021-06-07 PROCEDURE — 73552 X-RAY EXAM OF FEMUR 2/>: CPT | Mod: 26,RT

## 2021-06-07 PROCEDURE — 73560 X-RAY EXAM OF KNEE 1 OR 2: CPT | Mod: 26,RT

## 2021-07-12 ENCOUNTER — APPOINTMENT (OUTPATIENT)
Dept: RADIOLOGY | Facility: CLINIC | Age: 71
End: 2021-07-12
Payer: MEDICARE

## 2021-07-12 ENCOUNTER — OUTPATIENT (OUTPATIENT)
Dept: OUTPATIENT SERVICES | Facility: HOSPITAL | Age: 71
LOS: 1 days | End: 2021-07-12

## 2021-07-12 ENCOUNTER — TRANSCRIPTION ENCOUNTER (OUTPATIENT)
Age: 71
End: 2021-07-12

## 2021-07-12 DIAGNOSIS — Z93.2 ILEOSTOMY STATUS: Chronic | ICD-10-CM

## 2021-07-12 PROCEDURE — 73562 X-RAY EXAM OF KNEE 3: CPT | Mod: 26,RT

## 2021-07-12 PROCEDURE — 73552 X-RAY EXAM OF FEMUR 2/>: CPT | Mod: 26,RT

## 2021-09-20 ENCOUNTER — OUTPATIENT (OUTPATIENT)
Dept: OUTPATIENT SERVICES | Facility: HOSPITAL | Age: 71
LOS: 1 days | End: 2021-09-20
Payer: MEDICARE

## 2021-09-20 ENCOUNTER — APPOINTMENT (OUTPATIENT)
Dept: RADIOLOGY | Facility: CLINIC | Age: 71
End: 2021-09-20

## 2021-09-20 DIAGNOSIS — Z93.2 ILEOSTOMY STATUS: Chronic | ICD-10-CM

## 2021-09-20 PROCEDURE — 73564 X-RAY EXAM KNEE 4 OR MORE: CPT | Mod: 26,RT

## 2021-10-18 PROBLEM — Z00.00 ENCOUNTER FOR PREVENTIVE HEALTH EXAMINATION: Status: ACTIVE | Noted: 2021-10-18

## 2021-10-21 ENCOUNTER — APPOINTMENT (OUTPATIENT)
Dept: CT IMAGING | Facility: CLINIC | Age: 71
End: 2021-10-21
Payer: SELF-PAY

## 2021-10-21 ENCOUNTER — OUTPATIENT (OUTPATIENT)
Dept: OUTPATIENT SERVICES | Facility: HOSPITAL | Age: 71
LOS: 1 days | End: 2021-10-21

## 2021-10-21 DIAGNOSIS — Z93.2 ILEOSTOMY STATUS: Chronic | ICD-10-CM

## 2021-10-21 PROCEDURE — 75571 CT HRT W/O DYE W/CA TEST: CPT | Mod: 26

## 2022-01-24 ENCOUNTER — OUTPATIENT (OUTPATIENT)
Dept: OUTPATIENT SERVICES | Facility: HOSPITAL | Age: 72
LOS: 1 days | End: 2022-01-24
Payer: MEDICARE

## 2022-01-24 DIAGNOSIS — Z93.2 ILEOSTOMY STATUS: Chronic | ICD-10-CM

## 2022-01-24 PROCEDURE — 73590 X-RAY EXAM OF LOWER LEG: CPT | Mod: 26,RT

## 2022-01-24 PROCEDURE — 73552 X-RAY EXAM OF FEMUR 2/>: CPT | Mod: 26,RT

## 2022-04-18 ENCOUNTER — OUTPATIENT (OUTPATIENT)
Dept: OUTPATIENT SERVICES | Facility: HOSPITAL | Age: 72
LOS: 1 days | End: 2022-04-18
Payer: MEDICARE

## 2022-04-18 ENCOUNTER — APPOINTMENT (OUTPATIENT)
Dept: RADIOLOGY | Facility: CLINIC | Age: 72
End: 2022-04-18

## 2022-04-18 DIAGNOSIS — Z93.2 ILEOSTOMY STATUS: Chronic | ICD-10-CM

## 2022-04-18 PROCEDURE — 73562 X-RAY EXAM OF KNEE 3: CPT | Mod: 26,RT

## 2022-04-18 PROCEDURE — 73552 X-RAY EXAM OF FEMUR 2/>: CPT | Mod: 26,RT

## 2022-04-18 PROCEDURE — 73502 X-RAY EXAM HIP UNI 2-3 VIEWS: CPT | Mod: 26,RT

## 2023-06-18 NOTE — ED PROVIDER NOTE - MEDICAL DECISION MAKING DETAILS
pt w/ superficial laceration that occurred last night, no active bleeding, no gaping wound or concern for cosmetic appearing, no compromise of bending of finger, nerve vascular intact. Will allow to heal w/ secondary intention. Advised on wound care. Will update tetanus.
[[id number]]

## 2023-11-17 NOTE — PHYSICAL THERAPY INITIAL EVALUATION ADULT - DISCHARGE DISPOSITION, PT EVAL
Spoke with Ashley and she okayed the 11/2/23 date. Had front office call and schedule her.      home w/ home PT

## 2023-12-07 NOTE — ED PROVIDER NOTE - NS ED MD EM SELECTION
"Physical Therapy Treatment    Patient Name: Autumn Chapa  MRN: [id number]  Today's Date: 12/7/2023  Time Calculation  Start Time: 1504  Stop Time: 1545  Time Calculation (min): 41 min    Insurance:  Visit:  3 of 40  Auth required: No  Payor: EVELINARAFIQ / Plan: [employer]AN / Product Type: *No Product type* /     Assessment:   Able to progress strengthening without increased sx.    Plan:   Continue to progress core stabilization and balance.    Current Problem  1. Bilateral hip pain            General  General  Reason for Referral: Hip/knee pain  Referred By: Dr. Cm    Subjective    Shocked at how well she continues to feel.  Stairs are better especially when she is aware of mechanics.    Precautions  Precautions  Medical Precautions: No known precautions/limitation (Medical hx reviewed.)  Pain  Pain Assessment  Pain Assessment: 0-10  Pain Score: 0 - No pain  Pain Location: Hip    Objective   TTP R/L piriformis    TREATMENT  THERAPEUTIC EXERCISE:  Recumbent bike seat 8 random level 2 for 6mins  Fig 4 stretch 20\"x2 ea  Bridges 10x  SL ABD 10x ea  SLS with 3-way tap 8x ea  RTB sidestepping 3 laps  TG L5 + 20# SDR 4 B press 10x  Fwd step and hold on foam 10x ea    MANUAL THERAPY:      NEUROMUSCULAR RE-EDUCATION:      THERAPEUTIC ACTIVITY:      MODALITIES:      OP EDUCATION:   Access Code: [email or web address]  URL: [email or web address]  Date: 12/07/2023  Prepared by: Ashley Pinon    Exercises  - Supine Figure 4 Piriformis Stretch  - 2 x daily - 7 x weekly - 1 sets - 3 reps - 20 hold  - Supine Lower Trunk Rotation  - 2 x daily - 7 x weekly - 1 sets - 10 reps  - Supine Transversus Abdominis Bracing - Hands on Stomach  - 2 x daily - 7 x weekly - 1 sets - 10 reps - 5 hold  - Supine March  - 2 x daily - 7 x weekly - 1 sets - 10 reps  - Supine Bridge  - 2 x daily - 7 x weekly - 1 sets - 10 reps  - Clamshell  - 2 x daily - 7 x weekly - 1 sets - 10 reps  - Sidelying Hip Abduction  - 2 x daily - 7 x weekly - 1 " sets - 10 reps  - Single Leg Balance with Clock Reach  - 2 x daily - 7 x weekly - 1 sets - 10 reps  - Step Up  - 2 x daily - 7 x weekly - 1 sets - 10 reps  - Side Stepping with Resistance at Feet  - 2 x daily - 7 x weekly - 1 sets - 10 reps    Goals:  1. Pain 0/10  2. Outcomes Measure:  LEFS 65/80  3. BLE strength 5/5 to allow patient to navigate stairs without increased sx.  4. Independent activation of TA with all functional mobility to allow the patient to perform transfers and bed mobility without increased sx.  5. Demonstrates independence with HEP.   [id number] Detailed

## 2024-01-22 ENCOUNTER — APPOINTMENT (OUTPATIENT)
Dept: UROLOGY | Facility: CLINIC | Age: 74
End: 2024-01-22

## 2024-09-03 NOTE — PATIENT PROFILE ADULT - NSPROGENPREVTRANSF_GEN_A_NUR
Lev Peña is a 86 year old male.    HPI:     Chief Complaint   Patient presents with    Follow - Up     Patient is here for annual follow up, states that has urinary frequency.        86-year-old male with a history of BPH, bladder stones and follow-up to visit July 18, 2023.  Continues on finasteride 5 mg daily.  In April 2023 he underwent a TURP in the context of gross hematuria and admission at the [facility] near his winter home.  His urination symptoms continue to be stable.  IPSS score today is 11 quality-of-life index is 1.  He tells me he is not bothered by his urination symptoms.  Bladder scan at last visit demonstrated appropriate bladder emptying.  He denies any further episodes of gross hematuria.  No unexplained weight loss or bone pain is reported.      HISTORY:  Past Medical History:    Abnormal blood sugar    High FBS    BPH (benign prostatic hyperplasia)    Diverticulosis of colon    colonic diverticulosis    H/O blood clots    Blood clots in left leg, blood clots in lung    Osteoarthritis of hip    THR 2007    Other and unspecified hyperlipidemia    Pulmonary embolism (HCC)    RBBB    Abnormal EKG    Renal calculus    Thrombophlebitis leg    LT leg    Torn muscle    torn quadriceps repair on right lower extremity    Unspecified essential hypertension    Urolithiasis      Past Surgical History:   Procedure Laterality Date    Appendectomy      Cataract Bilateral May and June 2015    Colonoscopy  2016    Colonoscopy N/A 6/3/2022    Procedure: COLONOSCOPY;  Surgeon: Rosanne Moses MD;  Location: [facility] ENDOSCOPY    Electrocardiogram, complete  05-    Scanned to media tab 05-    Electrocardiogram, complete  05-    Scanned to media tab 05-    Hip replacement surgery  2004, 2007    THR    Other surgical history      cystoscopy [facility]      Family History   Problem Relation Age of Onset    Heart Disease Father         CAD (cause of death), age 89    Cancer  Father         Bladder    Diabetes Father     Cancer Mother 54        Intestinal    Diabetes Sister     Other (Other) Sister     Diabetes Sister     Other (Cancer prostate or kidney) Other         No family H      Social History:   Social History     Socioeconomic History    Marital status:    Tobacco Use    Smoking status: Never    Smokeless tobacco: Never   Vaping Use    Vaping status: Never Used   Substance and Sexual Activity    Alcohol use: Yes     Alcohol/week: 0.0 standard drinks of alcohol     Comment: Social    Drug use: No   Other Topics Concern    Caffeine Concern Yes     Comment: coffee, 1 cup daily    Right Handed Yes        Medications (Active prior to today's visit):  Current Outpatient Medications   Medication Sig Dispense Refill    finasteride 5 MG Oral Tab TAKE 1 TABLET BY MOUTH EVERY DAY 90 tablet 3    warfarin 5 MG Oral Tab TAKE 1 TO 2 TABLETS BY MOUTH DAILY AS DIRECTED BY COUMADIN CLINIC 180 tablet 3    atorvastatin (LIPITOR) 40 MG Oral Tab Take 1 tablet (40 mg total) by mouth nightly. 90 tablet 3    metoprolol succinate ER (TOPROL XL) 50 MG Oral Tablet 24 Hr Take 1 tablet (50 mg total) by mouth daily. 90 tablet 3    Flaxseed, Linseed, (FLAX SEED OIL) 1000 MG Oral Cap Take 1 capsule by mouth daily.      Multiple Vitamins-Minerals (MULTIVITAL) Oral Tab Take 1 tablet by mouth daily.      Vitamin C (VITAMIN C) 500 MG Oral Tab Take 1 tablet (500 mg total) by mouth daily.         Allergies:  No Known Allergies      ROS:       PHYSICAL EXAM:        ASSESSMENT/PLAN:   Assessment   Encounter Diagnosis   Name Primary?    Benign prostatic hyperplasia with urinary frequency        Recommend:  - Continue on finasteride 5 mg daily.  Prescription refill was provided.  - Follow-up on a as needed basis.  If his primary care physician is comfortable refilling his prescription I told the patient he can return to see us as needed.         Orders This Visit:  No orders of the defined types were placed in  this encounter.      Meds This Visit:  Requested Prescriptions     Signed Prescriptions Disp Refills    finasteride 5 MG Oral Tab 90 tablet 3     Sig: TAKE 1 TABLET BY MOUTH EVERY DAY       Imaging & Referrals:  None     9/3/2024  Robert Davis MD           no